# Patient Record
Sex: FEMALE | Race: WHITE | NOT HISPANIC OR LATINO | URBAN - METROPOLITAN AREA
[De-identification: names, ages, dates, MRNs, and addresses within clinical notes are randomized per-mention and may not be internally consistent; named-entity substitution may affect disease eponyms.]

---

## 2017-05-09 ENCOUNTER — OUTPATIENT (OUTPATIENT)
Dept: OUTPATIENT SERVICES | Facility: HOSPITAL | Age: 72
LOS: 1 days | End: 2017-05-09
Payer: MEDICARE

## 2017-05-09 PROCEDURE — 71020: CPT | Mod: 26

## 2017-05-09 PROCEDURE — 93306 TTE W/DOPPLER COMPLETE: CPT

## 2017-05-09 PROCEDURE — 71046 X-RAY EXAM CHEST 2 VIEWS: CPT

## 2017-05-09 PROCEDURE — 93306 TTE W/DOPPLER COMPLETE: CPT | Mod: 26

## 2017-05-15 ENCOUNTER — OUTPATIENT (OUTPATIENT)
Dept: OUTPATIENT SERVICES | Facility: HOSPITAL | Age: 72
LOS: 1 days | End: 2017-05-15
Payer: MEDICARE

## 2017-05-15 PROCEDURE — 71250 CT THORAX DX C-: CPT

## 2017-05-15 PROCEDURE — 71250 CT THORAX DX C-: CPT | Mod: 26

## 2017-05-18 ENCOUNTER — CHART COPY (OUTPATIENT)
Age: 72
End: 2017-05-18

## 2017-05-30 ENCOUNTER — APPOINTMENT (OUTPATIENT)
Dept: PULMONOLOGY | Facility: CLINIC | Age: 72
End: 2017-05-30

## 2017-05-30 VITALS
OXYGEN SATURATION: 96 % | TEMPERATURE: 98.4 F | HEART RATE: 76 BPM | DIASTOLIC BLOOD PRESSURE: 70 MMHG | BODY MASS INDEX: 14.85 KG/M2 | HEIGHT: 64 IN | SYSTOLIC BLOOD PRESSURE: 118 MMHG | WEIGHT: 87 LBS

## 2017-05-30 DIAGNOSIS — I25.10 ATHEROSCLEROTIC HEART DISEASE OF NATIVE CORONARY ARTERY W/OUT ANGINA PECTORIS: ICD-10-CM

## 2017-05-30 DIAGNOSIS — R91.8 OTHER NONSPECIFIC ABNORMAL FINDING OF LUNG FIELD: ICD-10-CM

## 2017-05-31 PROBLEM — I25.10 CORONARY DISEASE: Status: RESOLVED | Noted: 2017-05-31 | Resolved: 2017-05-31

## 2017-05-31 PROBLEM — R91.8 LUNG MASS: Status: ACTIVE | Noted: 2017-05-31

## 2017-06-01 ENCOUNTER — FORM ENCOUNTER (OUTPATIENT)
Age: 72
End: 2017-06-01

## 2017-06-02 ENCOUNTER — OUTPATIENT (OUTPATIENT)
Dept: OUTPATIENT SERVICES | Facility: HOSPITAL | Age: 72
LOS: 1 days | End: 2017-06-02
Payer: MEDICARE

## 2017-06-02 PROCEDURE — A9552: CPT

## 2017-06-02 PROCEDURE — 78815 PET IMAGE W/CT SKULL-THIGH: CPT

## 2017-06-02 PROCEDURE — 78815 PET IMAGE W/CT SKULL-THIGH: CPT | Mod: 26

## 2017-06-21 ENCOUNTER — FORM ENCOUNTER (OUTPATIENT)
Age: 72
End: 2017-06-21

## 2017-06-22 ENCOUNTER — OUTPATIENT (OUTPATIENT)
Dept: OUTPATIENT SERVICES | Facility: HOSPITAL | Age: 72
LOS: 1 days | End: 2017-06-22
Payer: MEDICARE

## 2017-06-22 ENCOUNTER — RESULT REVIEW (OUTPATIENT)
Age: 72
End: 2017-06-22

## 2017-06-22 ENCOUNTER — MESSAGE (OUTPATIENT)
Age: 72
End: 2017-06-22

## 2017-06-22 LAB
APTT BLD: 29 SEC — SIGNIFICANT CHANGE UP (ref 27.5–37.4)
INR BLD: 0.92 — SIGNIFICANT CHANGE UP (ref 0.88–1.16)
PROTHROM AB SERPL-ACNC: 10.2 SEC — SIGNIFICANT CHANGE UP (ref 9.8–12.7)

## 2017-06-22 PROCEDURE — 85730 THROMBOPLASTIN TIME PARTIAL: CPT

## 2017-06-22 PROCEDURE — 71035: CPT

## 2017-06-22 PROCEDURE — 77012 CT SCAN FOR NEEDLE BIOPSY: CPT | Mod: 26

## 2017-06-22 PROCEDURE — 32405: CPT

## 2017-06-22 PROCEDURE — 88173 CYTOPATH EVAL FNA REPORT: CPT

## 2017-06-22 PROCEDURE — 77012 CT SCAN FOR NEEDLE BIOPSY: CPT

## 2017-06-22 PROCEDURE — 87015 SPECIMEN INFECT AGNT CONCNTJ: CPT

## 2017-06-22 PROCEDURE — 87070 CULTURE OTHR SPECIMN AEROBIC: CPT

## 2017-06-22 PROCEDURE — 87206 SMEAR FLUORESCENT/ACID STAI: CPT

## 2017-06-22 PROCEDURE — 88312 SPECIAL STAINS GROUP 1: CPT

## 2017-06-22 PROCEDURE — 85610 PROTHROMBIN TIME: CPT

## 2017-06-22 PROCEDURE — 87075 CULTR BACTERIA EXCEPT BLOOD: CPT

## 2017-06-22 PROCEDURE — 87116 MYCOBACTERIA CULTURE: CPT

## 2017-06-22 PROCEDURE — 88305 TISSUE EXAM BY PATHOLOGIST: CPT

## 2017-06-22 PROCEDURE — 71035: CPT | Mod: 26

## 2017-06-22 RX ORDER — DIPHENHYDRAMINE HCL 50 MG
50 CAPSULE ORAL ONCE
Qty: 0 | Refills: 0 | Status: DISCONTINUED | OUTPATIENT
Start: 2017-06-22 | End: 2017-07-07

## 2017-06-22 RX ORDER — MIDAZOLAM HYDROCHLORIDE 1 MG/ML
1 INJECTION, SOLUTION INTRAMUSCULAR; INTRAVENOUS
Qty: 0 | Refills: 0 | Status: DISCONTINUED | OUTPATIENT
Start: 2017-06-22 | End: 2017-06-22

## 2017-06-23 LAB
GRAM STN FLD: SIGNIFICANT CHANGE UP
NIGHT BLUE STAIN TISS: SIGNIFICANT CHANGE UP
SPECIMEN SOURCE: SIGNIFICANT CHANGE UP
SPECIMEN SOURCE: SIGNIFICANT CHANGE UP

## 2017-06-25 LAB
CULTURE RESULTS: NO GROWTH — SIGNIFICANT CHANGE UP
NON-GYNECOLOGICAL CYTOLOGY STUDY: SIGNIFICANT CHANGE UP
SPECIMEN SOURCE: SIGNIFICANT CHANGE UP

## 2017-06-26 LAB — SURGICAL PATHOLOGY STUDY: SIGNIFICANT CHANGE UP

## 2017-07-02 LAB
APTT BLD: 41.7 SEC
BASOPHILS # BLD AUTO: 0.04 K/UL
BASOPHILS NFR BLD AUTO: 0.5 %
EOSINOPHIL # BLD AUTO: 0.26 K/UL
EOSINOPHIL NFR BLD AUTO: 3.6 %
HCT VFR BLD CALC: 34.3 %
HGB BLD-MCNC: 10 G/DL
IMM GRANULOCYTES NFR BLD AUTO: 0.3 %
INR PPP: 2.15 RATIO
LYMPHOCYTES # BLD AUTO: 1.86 K/UL
LYMPHOCYTES NFR BLD AUTO: 25.4 %
MAN DIFF?: NORMAL
MCHC RBC-ENTMCNC: 22.9 PG
MCHC RBC-ENTMCNC: 29.2 GM/DL
MCV RBC AUTO: 78.7 FL
MONOCYTES # BLD AUTO: 0.4 K/UL
MONOCYTES NFR BLD AUTO: 5.5 %
NEUTROPHILS # BLD AUTO: 4.73 K/UL
NEUTROPHILS NFR BLD AUTO: 64.7 %
PLATELET # BLD AUTO: 252 K/UL
PT BLD: 24.7 SEC
RBC # BLD: 4.36 M/UL
RBC # FLD: 17.6 %
WBC # FLD AUTO: 7.31 K/UL

## 2017-07-06 LAB — MISCELLANEOUS TEST NAME: SIGNIFICANT CHANGE UP

## 2017-08-15 ENCOUNTER — APPOINTMENT (OUTPATIENT)
Dept: HEART AND VASCULAR | Facility: CLINIC | Age: 72
End: 2017-08-15
Payer: MEDICARE

## 2017-08-15 VITALS — DIASTOLIC BLOOD PRESSURE: 60 MMHG | SYSTOLIC BLOOD PRESSURE: 115 MMHG | HEART RATE: 80 BPM

## 2017-08-15 PROCEDURE — 93283 PRGRMG EVAL IMPLANTABLE DFB: CPT

## 2017-08-17 ENCOUNTER — MOBILE ON CALL (OUTPATIENT)
Age: 72
End: 2017-08-17

## 2017-08-31 ENCOUNTER — MOBILE ON CALL (OUTPATIENT)
Age: 72
End: 2017-08-31

## 2017-09-05 ENCOUNTER — OTHER (OUTPATIENT)
Age: 72
End: 2017-09-05

## 2017-09-05 DIAGNOSIS — A31.0 PULMONARY MYCOBACTERIAL INFECTION: ICD-10-CM

## 2017-09-07 PROBLEM — A31.0 MAI (MYCOBACTERIUM AVIUM-INTRACELLULARE): Status: ACTIVE | Noted: 2017-09-07

## 2017-10-12 LAB
CULTURE RESULTS: SIGNIFICANT CHANGE UP
SPECIMEN SOURCE: SIGNIFICANT CHANGE UP

## 2017-11-14 ENCOUNTER — INPATIENT (INPATIENT)
Facility: HOSPITAL | Age: 72
LOS: 7 days | Discharge: HOME | DRG: 607 | End: 2017-11-22
Attending: SPECIALIST | Admitting: SPECIALIST
Payer: MEDICARE

## 2017-11-14 VITALS
RESPIRATION RATE: 18 BRPM | SYSTOLIC BLOOD PRESSURE: 125 MMHG | OXYGEN SATURATION: 98 % | HEART RATE: 66 BPM | DIASTOLIC BLOOD PRESSURE: 73 MMHG | TEMPERATURE: 98 F | WEIGHT: 87.08 LBS

## 2017-11-14 LAB
ANION GAP SERPL CALC-SCNC: 16 MMOL/L — SIGNIFICANT CHANGE UP (ref 5–17)
APPEARANCE UR: CLEAR — SIGNIFICANT CHANGE UP
APTT BLD: 38 SEC — HIGH (ref 27.5–37.4)
BASOPHILS NFR BLD AUTO: 0.9 % — SIGNIFICANT CHANGE UP (ref 0–2)
BILIRUB UR-MCNC: NEGATIVE — SIGNIFICANT CHANGE UP
BUN SERPL-MCNC: 18 MG/DL — SIGNIFICANT CHANGE UP (ref 7–23)
CALCIUM SERPL-MCNC: 9.3 MG/DL — SIGNIFICANT CHANGE UP (ref 8.4–10.5)
CHLORIDE SERPL-SCNC: 100 MMOL/L — SIGNIFICANT CHANGE UP (ref 96–108)
CO2 SERPL-SCNC: 21 MMOL/L — LOW (ref 22–31)
COLOR SPEC: YELLOW — SIGNIFICANT CHANGE UP
CREAT SERPL-MCNC: 0.93 MG/DL — SIGNIFICANT CHANGE UP (ref 0.5–1.3)
DIFF PNL FLD: NEGATIVE — SIGNIFICANT CHANGE UP
EOSINOPHIL NFR BLD AUTO: 2.2 % — SIGNIFICANT CHANGE UP (ref 0–6)
GLUCOSE SERPL-MCNC: 77 MG/DL — SIGNIFICANT CHANGE UP (ref 70–99)
GLUCOSE UR QL: NEGATIVE — SIGNIFICANT CHANGE UP
HCT VFR BLD CALC: 31.2 % — LOW (ref 34.5–45)
HGB BLD-MCNC: 9.1 G/DL — LOW (ref 11.5–15.5)
INR BLD: 2.06 — HIGH (ref 0.88–1.16)
KETONES UR-MCNC: NEGATIVE — SIGNIFICANT CHANGE UP
LACTATE SERPL-SCNC: 1.3 MMOL/L — SIGNIFICANT CHANGE UP (ref 0.5–2)
LEUKOCYTE ESTERASE UR-ACNC: NEGATIVE — SIGNIFICANT CHANGE UP
LYMPHOCYTES # BLD AUTO: 23.5 % — SIGNIFICANT CHANGE UP (ref 13–44)
MCHC RBC-ENTMCNC: 20.2 PG — LOW (ref 27–34)
MCHC RBC-ENTMCNC: 29.2 G/DL — LOW (ref 32–36)
MCV RBC AUTO: 69.2 FL — LOW (ref 80–100)
MONOCYTES NFR BLD AUTO: 5.5 % — SIGNIFICANT CHANGE UP (ref 2–14)
NEUTROPHILS NFR BLD AUTO: 67.9 % — SIGNIFICANT CHANGE UP (ref 43–77)
NITRITE UR-MCNC: NEGATIVE — SIGNIFICANT CHANGE UP
PH UR: 6 — SIGNIFICANT CHANGE UP (ref 5–8)
PLATELET # BLD AUTO: 218 K/UL — SIGNIFICANT CHANGE UP (ref 150–400)
POTASSIUM SERPL-MCNC: 4.4 MMOL/L — SIGNIFICANT CHANGE UP (ref 3.5–5.3)
POTASSIUM SERPL-SCNC: 4.4 MMOL/L — SIGNIFICANT CHANGE UP (ref 3.5–5.3)
PROT UR-MCNC: NEGATIVE MG/DL — SIGNIFICANT CHANGE UP
PROTHROM AB SERPL-ACNC: 23.2 SEC — HIGH (ref 9.8–12.7)
RBC # BLD: 4.51 M/UL — SIGNIFICANT CHANGE UP (ref 3.8–5.2)
RBC # FLD: 18.9 % — HIGH (ref 10.3–16.9)
SODIUM SERPL-SCNC: 137 MMOL/L — SIGNIFICANT CHANGE UP (ref 135–145)
SP GR SPEC: <=1.005 — SIGNIFICANT CHANGE UP (ref 1–1.03)
UROBILINOGEN FLD QL: 0.2 E.U./DL — SIGNIFICANT CHANGE UP
WBC # BLD: 7.7 K/UL — SIGNIFICANT CHANGE UP (ref 3.8–10.5)
WBC # FLD AUTO: 7.7 K/UL — SIGNIFICANT CHANGE UP (ref 3.8–10.5)

## 2017-11-14 PROCEDURE — 93010 ELECTROCARDIOGRAM REPORT: CPT

## 2017-11-14 PROCEDURE — 71010: CPT | Mod: 26

## 2017-11-14 PROCEDURE — 99285 EMERGENCY DEPT VISIT HI MDM: CPT | Mod: 25

## 2017-11-14 PROCEDURE — 99223 1ST HOSP IP/OBS HIGH 75: CPT | Mod: GC

## 2017-11-14 PROCEDURE — 93971 EXTREMITY STUDY: CPT | Mod: 26,LT

## 2017-11-14 RX ORDER — SODIUM CHLORIDE 9 MG/ML
500 INJECTION INTRAMUSCULAR; INTRAVENOUS; SUBCUTANEOUS ONCE
Qty: 0 | Refills: 0 | Status: COMPLETED | OUTPATIENT
Start: 2017-11-14 | End: 2017-11-14

## 2017-11-14 RX ORDER — ONDANSETRON 8 MG/1
4 TABLET, FILM COATED ORAL ONCE
Qty: 0 | Refills: 0 | Status: COMPLETED | OUTPATIENT
Start: 2017-11-14 | End: 2017-11-14

## 2017-11-14 RX ADMIN — ONDANSETRON 4 MILLIGRAM(S): 8 TABLET, FILM COATED ORAL at 21:45

## 2017-11-14 RX ADMIN — ONDANSETRON 4 MILLIGRAM(S): 8 TABLET, FILM COATED ORAL at 21:00

## 2017-11-14 NOTE — ED ADULT NURSE NOTE - CHPI ED SYMPTOMS NEG
no itching/no petechia/no bruising/no chills/no decreased eating/drinking/no scaly patches on skin/no fever/no vomiting

## 2017-11-14 NOTE — ED PROVIDER NOTE - MEDICAL DECISION MAKING DETAILS
72F with LUE swelling/redness, concern for cellulitis vs DVT. Distal neurovasc intact. Vitals wnl, pt is on Azithromycin having diarrhea/vomiting associated with that, unable to reji PO. Patient has been trying bacitracin topically, unable to help. Will attempt to contact Dr. Faust, Dr. Alvarenga. Spoke to sister to discuss need for admission. Plan for labs, imaging.

## 2017-11-14 NOTE — ED ADULT NURSE NOTE - PMH
Anxiety disorder due to medical condition  Anxiety disorder due to general medical condition  Atherosclerosis of coronary artery  CAD (coronary atherosclerotic disease)  Automatic implantable cardiac defibrillator in situ  AICD (automatic cardioverter/defibrillator) present  Congestive heart failure  EF 20-25%  Diaphragmatic hernia  Hiatal hernia  Essential hypertension  Hypertension  Gastroesophageal reflux disease  GERD (gastroesophageal reflux disease)  Insomnia  Insomnia  Migraine  Migraine  Old myocardial infarction  MI, old  Paroxysmal ventricular tachycardia  Ventricular tachycardia  Spinal stenosis  Spinal stenosis  Status post percutaneous transluminal coronary angioplasty  Stented coronary artery Anxiety disorder due to medical condition  Anxiety disorder due to general medical condition  Atherosclerosis of coronary artery  CAD (coronary atherosclerotic disease)  Automatic implantable cardiac defibrillator in situ  AICD (automatic cardioverter/defibrillator) present  Congestive heart failure  EF 20-25%  Diaphragmatic hernia  Hiatal hernia  Essential hypertension  Hypertension  Gastroesophageal reflux disease  GERD (gastroesophageal reflux disease)  Insomnia  Insomnia  Migraine  Migraine  Mycobacterium avium-intracellulare infection    Mycobacterium avium-intracellulare infection    Old myocardial infarction  MI, old  Paroxysmal ventricular tachycardia  Ventricular tachycardia  Spinal stenosis  Spinal stenosis  Status post percutaneous transluminal coronary angioplasty  Stented coronary artery

## 2017-11-14 NOTE — H&P ADULT - PROBLEM SELECTOR PLAN 8
- DASH/TLC diet Was prescribed Ethambutol and Azithromycin of unknownn duration by Dr. Faust, however hasn't been taken medications 2/2 GI side effect.  - f/u w/ Dr Faust and Dr. Alvarenga

## 2017-11-14 NOTE — ED PROVIDER NOTE - CONSTITUTIONAL, MLM
normal... Well appearing, well nourished, awake, alert speaking in full sentences, anxious appearing

## 2017-11-14 NOTE — ED PROVIDER NOTE - PROGRESS NOTE DETAILS
Attempted to call patient's sister, Attempted to call patient's sister, aware that patient is here. Spoke to Dr. Faust, pt has VLADIMIR, Azithromycin Ethambutol plan to admit under hospitalist. Spoke to Dr. Alvarenga, aware of admission.

## 2017-11-14 NOTE — ED PROVIDER NOTE - OBJECTIVE STATEMENT
72F with anxiety, CAD, AICD, CHF, HTN, presenting with LUE redness, swelling states sudden onset. Reports pain, itching.

## 2017-11-14 NOTE — H&P ADULT - PMH
Anxiety disorder due to medical condition  Anxiety disorder due to general medical condition  Atherosclerosis of coronary artery  CAD (coronary atherosclerotic disease)  Automatic implantable cardiac defibrillator in situ  AICD (automatic cardioverter/defibrillator) present  Congestive heart failure  EF 20-25%  Diaphragmatic hernia  Hiatal hernia  Essential hypertension  Hypertension  Gastroesophageal reflux disease  GERD (gastroesophageal reflux disease)  Insomnia  Insomnia  Migraine  Migraine  Mycobacterium avium-intracellulare infection    Mycobacterium avium-intracellulare infection    Old myocardial infarction  MI, old  Paroxysmal ventricular tachycardia  Ventricular tachycardia  Spinal stenosis  Spinal stenosis  Status post percutaneous transluminal coronary angioplasty  Stented coronary artery

## 2017-11-14 NOTE — H&P ADULT - FAMILY HISTORY
Family history of malignant neoplasm of trachea, bronchus, and lung, Family history of lung cancer     Family history of cardiovascular disease, Family history of congestive heart failure     Mother  Still living? Unknown  Family history of ischemic heart disease, Age at diagnosis: Age Unknown     Aunt  Still living? Unknown  Family history of ischemic heart disease, Age at diagnosis: Age Unknown

## 2017-11-14 NOTE — ED PROVIDER NOTE - PMH
Anxiety disorder due to medical condition  Anxiety disorder due to general medical condition  Atherosclerosis of coronary artery  CAD (coronary atherosclerotic disease)  Automatic implantable cardiac defibrillator in situ  AICD (automatic cardioverter/defibrillator) present  Congestive heart failure  EF 20-25%  Diaphragmatic hernia  Hiatal hernia  Essential hypertension  Hypertension  Gastroesophageal reflux disease  GERD (gastroesophageal reflux disease)  Insomnia  Insomnia  Migraine  Migraine  Old myocardial infarction  MI, old  Paroxysmal ventricular tachycardia  Ventricular tachycardia  Spinal stenosis  Spinal stenosis  Status post percutaneous transluminal coronary angioplasty  Stented coronary artery

## 2017-11-14 NOTE — H&P ADULT - PSH
Automatic implantable cardiac defibrillator in situ  Automatic implantable cardioverter-defibrillator in situ  Other postprocedural status  S/P appendectomy  Status post percutaneous transluminal coronary angioplasty  Stented coronary artery

## 2017-11-14 NOTE — H&P ADULT - NSHPPHYSICALEXAM_GEN_ALL_CORE
.  VITAL SIGNS:  T(C): 36.4 (11-14-17 @ 20:45), Max: 36.7 (11-14-17 @ 16:14)  T(F): 97.6 (11-14-17 @ 20:45), Max: 98 (11-14-17 @ 16:14)  HR: 66 (11-14-17 @ 20:45) (66 - 66)  BP: 156/62 (11-14-17 @ 20:45) (125/73 - 156/62)  BP(mean): --  RR: 16 (11-14-17 @ 20:45) (16 - 18)  SpO2: 99% (11-14-17 @ 20:45) (98% - 99%)  Wt(kg): --    PHYSICAL EXAM:    Constitutional: WDWN resting comfortably in bed; NAD  Head: NC/AT  Eyes: PERRL, EOMI, clear conjunctiva  ENT: no nasal discharge; uvula midline, no oropharyngeal erythema or exudates; MMM  Neck: supple; no JVD or thyromegaly  Respiratory: CTA B/L; no W/R/R, no retractions  Cardiac: +S1/S2; RRR; no M/R/G; PMI non-displaced  Gastrointestinal: soft, NT/ND; no rebound or guarding; +BSx4  Genitourinary: normal external genitalia  Back: spine midline, no bony tenderness or step-offs; no CVAT B/L  Extremities: WWP, no clubbing or cyanosis; no peripheral edema  Musculoskeletal: NROM x4; no joint swelling, tenderness or erythema  Vascular: 2+ radial, femoral, DP/PT pulses B/L  Dermatologic: skin warm, dry and intact; no rashes, wounds, or scars  Lymphatic: no submandibular or cervical LAD  Neurologic: AAOx3; CNII-XII grossly intact; no focal deficits  Psychiatric: affect and characteristics of appearance, verbalizations, behaviors are appropriate .  VITAL SIGNS:  T(C): 36.4 (11-14-17 @ 20:45), Max: 36.7 (11-14-17 @ 16:14)  T(F): 97.6 (11-14-17 @ 20:45), Max: 98 (11-14-17 @ 16:14)  HR: 66 (11-14-17 @ 20:45) (66 - 66)  BP: 156/62 (11-14-17 @ 20:45) (125/73 - 156/62)  BP(mean): --  RR: 16 (11-14-17 @ 20:45) (16 - 18)  SpO2: 99% (11-14-17 @ 20:45) (98% - 99%)  Wt(kg): --    PHYSICAL EXAM:    Constitutional: Very anxious and talkative but NAD  Head: NC/AT  Eyes: PERRL, EOMI, clear conjunctiva  ENT: no nasal discharge; uvula midline, no oropharyngeal erythema or exudates; mild dryness of mucous membrane  Neck: supple; no JVD or thyromegaly  Respiratory: CTA B/L; no W/R/R, no retractions  Cardiac: +S1/S2  Gastrointestinal: soft, NT/ND; no rebound or guarding; +BSx4  Extremities: Mild skin redness of LUE but not warm to touch, no edema, b/l LE WWP

## 2017-11-14 NOTE — ED ADULT TRIAGE NOTE - OTHER COMPLAINTS
biba c/o of left arm redness, swelling and warmth x few days.  pt also reports intermittetn shortness of breath

## 2017-11-14 NOTE — H&P ADULT - PROBLEM SELECTOR PLAN 1
Erythema of LUE concerning for cellulitis, however physical exam is negative for warmth to touch of LUE, no pain in arm right now, and no edema noted. Unclear etiology of skin color but cannot r/o eczema.   s/p US of LUE negative for thrombus.  - Will c/w Clindamycin for now Erythema of LUE concerning for cellulitis, however physical exam is negative for warmth to touch of LUE, no pain in arm right now, and no edema noted. Unclear etiology of skin color but cannot r/o eczema.   s/p US of LUE negative for thrombus.  - hold off on abx for now errythema of LUE concerning for cellulitis, however physical exam is negative for warmth to touch of LUE, no pain in arm currently, and no edema noted. Unclear etiology of skin color but cannot r/o eczema.   s/p US of LUE negative for thrombus.  - hold off on abx for now errythema of LUE, however physical exam is negative for warmth to touch of LUE, no pain in arm currently, and no edema noted. Low suspicion for cellulitis at this time. Unclear etiology of skin color but cannot r/o eczema vs dermatitis.   s/p US of LUE negative for thrombus.  - hold off on abx for now

## 2017-11-14 NOTE — H&P ADULT - PROBLEM SELECTOR PLAN 2
No concerning for HF exacerbation at this time. Last Echo showing EF of 20-25%.  - Will c/w with home HF regimen

## 2017-11-14 NOTE — H&P ADULT - PROBLEM SELECTOR PLAN 6
Pt w/ severe anxiety. On Lorazepam 1mg PO at home.  - Will continue w/ Lorazepam PRN Microcytic anemia on CBC. Hgb 9.6 unknown baseline.  - F/u w/ iron studies in AM

## 2017-11-14 NOTE — ED PROVIDER NOTE - SKIN, MLM
redness to arm, mild induration, mild ttp, affecting forearm, no significant edema 2+ pulse, mild sloughing of skin

## 2017-11-14 NOTE — H&P ADULT - NSHPLABSRESULTS_GEN_ALL_CORE
.  LABS:                         9.1    7.7   )-----------( 218      ( 14 Nov 2017 19:21 )             31.2     11-14    137  |  100  |  18  ----------------------------<  77  4.4   |  21<L>  |  0.93    Ca    9.3      14 Nov 2017 19:21      PT/INR - ( 14 Nov 2017 19:19 )   PT: 23.2 sec;   INR: 2.06          PTT - ( 14 Nov 2017 19:19 )  PTT:38.0 sec  Urinalysis Basic - ( 14 Nov 2017 19:33 )    Color: Yellow / Appearance: Clear / SG: <=1.005 / pH: x  Gluc: x / Ketone: NEGATIVE  / Bili: Negative / Urobili: 0.2 E.U./dL   Blood: x / Protein: NEGATIVE mg/dL / Nitrite: NEGATIVE   Leuk Esterase: NEGATIVE / RBC: x / WBC x   Sq Epi: x / Non Sq Epi: x / Bacteria: x      CARDIAC MARKERS ( 14 Nov 2017 19:21 )  x     / <0.01 ng/mL / x     / x     / x          Serum Pro-Brain Natriuretic Peptide: 1515 pg/mL (11-14 @ 19:21)    Lactate, Blood: 1.3 mmoL/L (11-14 @ 19:19)      RADIOLOGY, EKG & ADDITIONAL TESTS: Reviewed.

## 2017-11-14 NOTE — ED ADULT NURSE REASSESSMENT NOTE - NS ED NURSE REASSESS COMMENT FT1
Pt continues to be uncooperative. Pt making multiple requests and demands from ED staff then refuses or delays staff or care when staff is at bedside. Pt at times is verbally abusive to staff.  Blood draw delayed by Pt agreeing then refusing then agreeing to blood draw.
Per covering RN, PT refuses fluids and antibiotics at this time.  Will continue to monitor. Pt advised of benefits of medication, pt continues to refuse. Pt is alert and oriented x3.
Report given to [facility],  US notified for transport to [facility].

## 2017-11-14 NOTE — H&P ADULT - PROBLEM SELECTOR PLAN 3
Pt w/ hx of PCI. No angina at this time. No suspicion for ACS.   - c/w aspirin, metoprolol and lipitor Pt w/ hx of PCI. No angina at this time. No suspicion for ACS.   - c/w aspirin, metoprolol and Lipitor

## 2017-11-14 NOTE — H&P ADULT - HISTORY OF PRESENT ILLNESS
72F with anxiety, CAD, AICD, CHF, HTN 72 F with PMHx anxiety, CAD s/p PCI, AICD/PPM, CHF, hiatal hernia, HTN, GERD, insomnia,  ventricular tachycardia, spinal stenosis s/p herniated disc repair, and arthritis was BIBA complaining of 1-day history of left upper extremity swelling, redness associated with pain. Denies any fever or chills. Patient also reports some nonspecific symptoms of dyspnea but no chest pain or palpitations. No recent mosquito bites in upper extremity. Patient is known to Dr. Faust and Dr. Alvarenga. Of note, patient has a ?hx of VLADIMIR and was prescribed Ethambutol and Azithromycin by , however has been unable to tolerate medications due to nausea and vomiting.  As per ED physician, the swelling in LUE was very subjective. Vitals in ED were unremarkable. Patient underwent a LUE US which was negative. Ordered for Clindamycin, which patient refused. Admitted to [facility] for further evaluation. 72 F with PMHx anxiety, CAD s/p PCI, AICD/PPM, CHF, hiatal hernia, HTN, GERD, insomnia,  ventricular tachycardia, spinal stenosis s/p herniated disc repair, and arthritis was BIBA complaining of 1-day history of left upper extremity swelling, redness associated with pain. Denies any fever or chills. Patient also reports some nonspecific symptoms of dyspnea but no chest pain or palpitations. No recent mosquito bites in upper extremity. Patient is known to Dr. Faust and Dr. Alvarenga. Of note, patient has a ?hx of VLADIMIR and was prescribed Ethambutol and Azithromycin by , however has been unable to tolerate medications due to nausea and vomiting.  As per ED physician, the swelling in LUE was very subjective. Vitals in ED were overall unremarkable. Patient underwent a LUE US which was negative. Ordered for Clindamycin, which patient refused. Admitted to [facility] for further evaluation.

## 2017-11-14 NOTE — H&P ADULT - PROBLEM SELECTOR PLAN 7
Was prescribed Ethambutol and Azithromycin of unknownn duration by Dr. Faust, however hasn't been taken medications 2/2 GI side effect.  - f/u w/ Dr Faust and Dr. Alvarenga Pt w/ severe anxiety. On Lorazepam 1mg PO at home.  - Will continue w/ Lorazepam PRN

## 2017-11-14 NOTE — ED ADULT NURSE NOTE - OBJECTIVE STATEMENT
Pt came to ED complaining of bilateral arm cellulitis and chronic SOB.  Pt currently uncooperative, PT refuses to let RN ask questions, Pt keeps talking and refusing to answer direct questions about history of current illness, refuses to change into gown, refuse to let RN evaluate lung sounds. Pt is uncooperative unless she is allowed she is speaking.  Several attempts at redirection has been unsuccessful. PT is alert and oriented x3. Pt came to ED complaining of bilateral arm cellulitis and chronic SOB.  Pt currently uncooperative, PT refuses to let RN ask questions, Pt keeps talking and refusing to answer direct questions about history of current illness, refuses to change into gown, refuse to let RN evaluate lung sounds. Pt is uncooperative unless she is speaking.  Several attempts at redirection has been unsuccessful. PT is alert and oriented x3.  Pt has redness, swelling, warmth on bilateral forearms, pt refuses to change to expose arms or skin.  Pt reports hx of DUY infection.  Pt currently speaking in complete, full sentences without pause. No SOB noted. Pt came to ED complaining of bilateral arm cellulitis and chronic SOB.  Pt currently uncooperative, PT refuses to let RN ask questions, Pt keeps talking and refusing to answer direct questions about history of current illness, refuses to change into gown, refuse to let RN evaluate lung sounds. Pt is uncooperative unless she is speaking.  Several attempts at redirection has been unsuccessful. PT is alert and oriented x3.  Pt has redness, swelling, warmth on bilateral forearms, pt refuses to change to expose arms or skin.  Pt reports hx of DUY infection.  Pt currently speaking in complete, full sentences without pause. No SOB noted.  PT refuses to give complete medication list.  Pt refuses lung sounds.

## 2017-11-14 NOTE — H&P ADULT - ASSESSMENT
72 F with PMHx anxiety, CAD s/p PCI, AICD/PPM, CHF, hiatal hernia, HTN, GERD, insomnia,  ventricular tachycardia, spinal stenosis s/p herniated disc repair, and arthritis here for LUE erytthema and subjective swelling concerning for cellulitis

## 2017-11-15 ENCOUNTER — TRANSCRIPTION ENCOUNTER (OUTPATIENT)
Age: 72
End: 2017-11-15

## 2017-11-15 DIAGNOSIS — Z29.9 ENCOUNTER FOR PROPHYLACTIC MEASURES, UNSPECIFIED: ICD-10-CM

## 2017-11-15 DIAGNOSIS — F41.9 ANXIETY DISORDER, UNSPECIFIED: ICD-10-CM

## 2017-11-15 DIAGNOSIS — I50.22 CHRONIC SYSTOLIC (CONGESTIVE) HEART FAILURE: ICD-10-CM

## 2017-11-15 DIAGNOSIS — R63.8 OTHER SYMPTOMS AND SIGNS CONCERNING FOOD AND FLUID INTAKE: ICD-10-CM

## 2017-11-15 DIAGNOSIS — I82.90 ACUTE EMBOLISM AND THROMBOSIS OF UNSPECIFIED VEIN: ICD-10-CM

## 2017-11-15 DIAGNOSIS — F06.4 ANXIETY DISORDER DUE TO KNOWN PHYSIOLOGICAL CONDITION: ICD-10-CM

## 2017-11-15 DIAGNOSIS — L03.114 CELLULITIS OF LEFT UPPER LIMB: ICD-10-CM

## 2017-11-15 DIAGNOSIS — R60.0 LOCALIZED EDEMA: ICD-10-CM

## 2017-11-15 DIAGNOSIS — I47.2 VENTRICULAR TACHYCARDIA: ICD-10-CM

## 2017-11-15 DIAGNOSIS — G43.909 MIGRAINE, UNSPECIFIED, NOT INTRACTABLE, WITHOUT STATUS MIGRAINOSUS: ICD-10-CM

## 2017-11-15 DIAGNOSIS — D50.9 IRON DEFICIENCY ANEMIA, UNSPECIFIED: ICD-10-CM

## 2017-11-15 DIAGNOSIS — I25.10 ATHEROSCLEROTIC HEART DISEASE OF NATIVE CORONARY ARTERY WITHOUT ANGINA PECTORIS: ICD-10-CM

## 2017-11-15 DIAGNOSIS — D64.9 ANEMIA, UNSPECIFIED: ICD-10-CM

## 2017-11-15 DIAGNOSIS — L30.9 DERMATITIS, UNSPECIFIED: ICD-10-CM

## 2017-11-15 DIAGNOSIS — I50.9 HEART FAILURE, UNSPECIFIED: ICD-10-CM

## 2017-11-15 DIAGNOSIS — A31.0 PULMONARY MYCOBACTERIAL INFECTION: ICD-10-CM

## 2017-11-15 DIAGNOSIS — I10 ESSENTIAL (PRIMARY) HYPERTENSION: ICD-10-CM

## 2017-11-15 LAB
ANION GAP SERPL CALC-SCNC: 15 MMOL/L — SIGNIFICANT CHANGE UP (ref 5–17)
BUN SERPL-MCNC: 17 MG/DL — SIGNIFICANT CHANGE UP (ref 7–23)
CALCIUM SERPL-MCNC: 8.9 MG/DL — SIGNIFICANT CHANGE UP (ref 8.4–10.5)
CHLORIDE SERPL-SCNC: 97 MMOL/L — SIGNIFICANT CHANGE UP (ref 96–108)
CO2 SERPL-SCNC: 20 MMOL/L — LOW (ref 22–31)
CREAT SERPL-MCNC: 0.91 MG/DL — SIGNIFICANT CHANGE UP (ref 0.5–1.3)
CULTURE RESULTS: NO GROWTH — SIGNIFICANT CHANGE UP
FERRITIN SERPL-MCNC: 7.4 NG/ML — LOW (ref 15–150)
GLUCOSE SERPL-MCNC: 72 MG/DL — SIGNIFICANT CHANGE UP (ref 70–99)
HCT VFR BLD CALC: 31.4 % — LOW (ref 34.5–45)
HGB BLD-MCNC: 9 G/DL — LOW (ref 11.5–15.5)
INR BLD: 1.89 — HIGH (ref 0.88–1.16)
IRON SATN MFR SERPL: 15 UG/DL — LOW (ref 30–160)
IRON SATN MFR SERPL: 5 % — LOW (ref 14–50)
MAGNESIUM SERPL-MCNC: 2.2 MG/DL — SIGNIFICANT CHANGE UP (ref 1.6–2.6)
MCHC RBC-ENTMCNC: 20 PG — LOW (ref 27–34)
MCHC RBC-ENTMCNC: 28.7 G/DL — LOW (ref 32–36)
MCV RBC AUTO: 69.6 FL — LOW (ref 80–100)
PLATELET # BLD AUTO: 219 K/UL — SIGNIFICANT CHANGE UP (ref 150–400)
POTASSIUM SERPL-MCNC: 4.1 MMOL/L — SIGNIFICANT CHANGE UP (ref 3.5–5.3)
POTASSIUM SERPL-SCNC: 4.1 MMOL/L — SIGNIFICANT CHANGE UP (ref 3.5–5.3)
PROTHROM AB SERPL-ACNC: 21.3 SEC — HIGH (ref 9.8–12.7)
RBC # BLD: 4.51 M/UL — SIGNIFICANT CHANGE UP (ref 3.8–5.2)
RBC # FLD: 19.4 % — HIGH (ref 10.3–16.9)
SODIUM SERPL-SCNC: 132 MMOL/L — LOW (ref 135–145)
SPECIMEN SOURCE: SIGNIFICANT CHANGE UP
TIBC SERPL-MCNC: 327 UG/DL — SIGNIFICANT CHANGE UP (ref 220–430)
TRANSFERRIN SERPL-MCNC: 278 MG/DL — SIGNIFICANT CHANGE UP (ref 200–360)
UIBC SERPL-MCNC: 312 UG/DL — SIGNIFICANT CHANGE UP (ref 110–370)
WBC # BLD: 7.6 K/UL — SIGNIFICANT CHANGE UP (ref 3.8–10.5)
WBC # FLD AUTO: 7.6 K/UL — SIGNIFICANT CHANGE UP (ref 3.8–10.5)

## 2017-11-15 PROCEDURE — 99233 SBSQ HOSP IP/OBS HIGH 50: CPT | Mod: GC

## 2017-11-15 RX ORDER — FERROUS SULFATE 325(65) MG
325 TABLET ORAL DAILY
Qty: 0 | Refills: 0 | Status: DISCONTINUED | OUTPATIENT
Start: 2017-11-15 | End: 2017-11-22

## 2017-11-15 RX ORDER — PANTOPRAZOLE SODIUM 20 MG/1
40 TABLET, DELAYED RELEASE ORAL
Qty: 0 | Refills: 0 | Status: DISCONTINUED | OUTPATIENT
Start: 2017-11-15 | End: 2017-11-15

## 2017-11-15 RX ORDER — SPIRONOLACTONE 25 MG/1
25 TABLET, FILM COATED ORAL DAILY
Qty: 0 | Refills: 0 | Status: DISCONTINUED | OUTPATIENT
Start: 2017-11-15 | End: 2017-11-22

## 2017-11-15 RX ORDER — ONDANSETRON 8 MG/1
4 TABLET, FILM COATED ORAL ONCE
Qty: 0 | Refills: 0 | Status: COMPLETED | OUTPATIENT
Start: 2017-11-15 | End: 2017-11-15

## 2017-11-15 RX ORDER — METOPROLOL TARTRATE 50 MG
25 TABLET ORAL DAILY
Qty: 0 | Refills: 0 | Status: DISCONTINUED | OUTPATIENT
Start: 2017-11-15 | End: 2017-11-22

## 2017-11-15 RX ORDER — BACITRACIN AND POLYMYXIN B SULFATE 500; 10000 [USP'U]/G; [USP'U]/G
1 OINTMENT TOPICAL
Qty: 0 | Refills: 0 | Status: DISCONTINUED | OUTPATIENT
Start: 2017-11-15 | End: 2017-11-22

## 2017-11-15 RX ORDER — ATORVASTATIN CALCIUM 80 MG/1
20 TABLET, FILM COATED ORAL AT BEDTIME
Qty: 0 | Refills: 0 | Status: DISCONTINUED | OUTPATIENT
Start: 2017-11-15 | End: 2017-11-18

## 2017-11-15 RX ORDER — WARFARIN SODIUM 2.5 MG/1
6 TABLET ORAL ONCE
Qty: 0 | Refills: 0 | Status: COMPLETED | OUTPATIENT
Start: 2017-11-15 | End: 2017-11-15

## 2017-11-15 RX ORDER — AMIODARONE HYDROCHLORIDE 400 MG/1
100 TABLET ORAL DAILY
Qty: 0 | Refills: 0 | Status: DISCONTINUED | OUTPATIENT
Start: 2017-11-15 | End: 2017-11-22

## 2017-11-15 RX ORDER — BACITRACIN AND POLYMYXIN B SULFATE 500; 10000 [USP'U]/G; [USP'U]/G
1 OINTMENT TOPICAL
Qty: 1 | Refills: 0 | OUTPATIENT
Start: 2017-11-15

## 2017-11-15 RX ORDER — AMIODARONE HYDROCHLORIDE 400 MG/1
50 TABLET ORAL DAILY
Qty: 0 | Refills: 0 | Status: DISCONTINUED | OUTPATIENT
Start: 2017-11-15 | End: 2017-11-15

## 2017-11-15 RX ORDER — PANTOPRAZOLE SODIUM 20 MG/1
40 TABLET, DELAYED RELEASE ORAL
Qty: 0 | Refills: 0 | Status: DISCONTINUED | OUTPATIENT
Start: 2017-11-15 | End: 2017-11-22

## 2017-11-15 RX ORDER — ONDANSETRON 8 MG/1
8 TABLET, FILM COATED ORAL ONCE
Qty: 0 | Refills: 0 | Status: COMPLETED | OUTPATIENT
Start: 2017-11-15 | End: 2017-11-15

## 2017-11-15 RX ORDER — FERROUS SULFATE 325(65) MG
1 TABLET ORAL
Qty: 14 | Refills: 0 | OUTPATIENT
Start: 2017-11-15 | End: 2017-11-29

## 2017-11-15 RX ORDER — ASPIRIN/CALCIUM CARB/MAGNESIUM 324 MG
81 TABLET ORAL DAILY
Qty: 0 | Refills: 0 | Status: DISCONTINUED | OUTPATIENT
Start: 2017-11-15 | End: 2017-11-22

## 2017-11-15 RX ADMIN — Medication 1 MILLIGRAM(S): at 18:44

## 2017-11-15 RX ADMIN — Medication 2 TABLET(S): at 08:45

## 2017-11-15 RX ADMIN — Medication 25 MILLIGRAM(S): at 08:49

## 2017-11-15 RX ADMIN — Medication 2.5 MILLIGRAM(S): at 08:49

## 2017-11-15 RX ADMIN — ONDANSETRON 4 MILLIGRAM(S): 8 TABLET, FILM COATED ORAL at 10:11

## 2017-11-15 RX ADMIN — WARFARIN SODIUM 6 MILLIGRAM(S): 2.5 TABLET ORAL at 17:21

## 2017-11-15 RX ADMIN — Medication 81 MILLIGRAM(S): at 08:49

## 2017-11-15 RX ADMIN — ONDANSETRON 8 MILLIGRAM(S): 8 TABLET, FILM COATED ORAL at 12:57

## 2017-11-15 RX ADMIN — ONDANSETRON 4 MILLIGRAM(S): 8 TABLET, FILM COATED ORAL at 02:58

## 2017-11-15 RX ADMIN — Medication 2 TABLET(S): at 13:30

## 2017-11-15 RX ADMIN — AMIODARONE HYDROCHLORIDE 100 MILLIGRAM(S): 400 TABLET ORAL at 12:57

## 2017-11-15 RX ADMIN — PANTOPRAZOLE SODIUM 40 MILLIGRAM(S): 20 TABLET, DELAYED RELEASE ORAL at 07:55

## 2017-11-15 RX ADMIN — Medication 1 MILLIGRAM(S): at 02:28

## 2017-11-15 RX ADMIN — PANTOPRAZOLE SODIUM 40 MILLIGRAM(S): 20 TABLET, DELAYED RELEASE ORAL at 19:48

## 2017-11-15 RX ADMIN — Medication 2 TABLET(S): at 03:15

## 2017-11-15 RX ADMIN — BACITRACIN AND POLYMYXIN B SULFATE 1 APPLICATION(S): 500; 10000 OINTMENT TOPICAL at 22:41

## 2017-11-15 RX ADMIN — Medication 2 TABLET(S): at 19:51

## 2017-11-15 RX ADMIN — Medication 2 TABLET(S): at 02:30

## 2017-11-15 RX ADMIN — Medication 1 TABLET(S): at 19:48

## 2017-11-15 RX ADMIN — Medication 2 TABLET(S): at 14:30

## 2017-11-15 RX ADMIN — BACITRACIN AND POLYMYXIN B SULFATE 1 APPLICATION(S): 500; 10000 OINTMENT TOPICAL at 12:01

## 2017-11-15 RX ADMIN — Medication 2 TABLET(S): at 07:59

## 2017-11-15 NOTE — DIETITIAN INITIAL EVALUATION ADULT. - ENERGY NEEDS
Height 64"; ABW 87#; #; 73%IBW  BMI 14.9  Ideal body weight used for calculations as pt <80% of IBW. Needs estimated 2/2 weight loss, recent decreased PO intake.

## 2017-11-15 NOTE — DIETITIAN INITIAL EVALUATION ADULT. - PROBLEM SELECTOR PLAN 3
Pt w/ hx of PCI. No angina at this time. No suspicion for ACS.   - c/w aspirin, metoprolol and Lipitor

## 2017-11-15 NOTE — DIETITIAN INITIAL EVALUATION ADULT. - OTHER INFO
73 yo/female admitted with possible upper arm cellulitis, also with N/V x 2 weeks 2/2 abx intolerance. pt visited in room, awake, alert, very plesant woman. Pt reports inability to keep anything down for 2 weeks 2/2 N/V; slowly regaining appetite though. Usually does not eat much; follows strict cardiac diet 2/2 PMH and family history. Lots of fish, oatmeal, vegetables; no caffeine or marinara sauce; no true food allergies, just intolerances. Some nausea still reported but no emesis- receiving zofran. Pt reports always being thin, but unsure of recent weigh lost. Has been in and out of hospital over past year and weight has fluctuated. Pt presenting with moderate malnutrition.

## 2017-11-15 NOTE — PROGRESS NOTE ADULT - PROBLEM SELECTOR PLAN 1
-errythema of LUE most likely 2/2 dermatits.  -  physical exam is negative for warmth to touch of LUE, no pain in arm currently, and no edema noted. Low suspicion for cellulitis at this time.    s/p US of LUE negative for thrombus.  - hold off on abx for now.  -pt given 24 hr notice.

## 2017-11-15 NOTE — DISCHARGE NOTE ADULT - PROVIDER TOKENS
FREE:[LAST:[MD Lyle],PHONE:[(   )    -],FAX:[(   )    -]] FREE:[LAST:[MD Lyle],PHONE:[(   )    -],FAX:[(   )    -]],TOKEN:'[id number]'

## 2017-11-15 NOTE — PROGRESS NOTE ADULT - SUBJECTIVE AND OBJECTIVE BOX
INTERVAL HPI/OVERNIGHT EVENTS:pt was seen and examoined at the bedside. NO acute events o/n    VITAL SIGNS:  T(F): 97.9 (11-15-17 @ 09:31)  HR: 69 (11-15-17 @ 09:31)  BP: 128/75 (11-15-17 @ 09:31)  RR: 20 (11-15-17 @ 13:52)  SpO2: 95% (11-15-17 @ 13:52)  Wt(kg): --    PHYSICAL EXAM:    Constitutional: NAD, well-groomed, well-developed  HEENT: PERRLA, EOMI, Normal Hearing, MMM  Neck: No LAD, No JVD  Back: Normal spine flexure, No CVA tenderness  Respiratory: CTAB   Cardiovascular: S1 and S2, RRR, no M/G/R  Gastrointestinal: BS+, soft, NT/ND  Extremities: No peripheral edema  Vascular: 2+ peripheral pulses  Neurological: A/O x 3, no focal deficits  Psychiatric: Normal mood, normal affect  Musculoskeletal: 5/5 strength b/l upper and lower extremities  Skin: No rashes      MEDICATIONS  (STANDING):  amiodarone    Tablet 100 milliGRAM(s) Oral daily  aspirin enteric coated 81 milliGRAM(s) Oral daily  atorvastatin 20 milliGRAM(s) Oral at bedtime  BACItracin/polymyxin B Ointment 1 Application(s) Topical two times a day  enalapril 2.5 milliGRAM(s) Oral daily  ferrous    sulfate 325 milliGRAM(s) Oral daily  LORazepam     Tablet 1 milliGRAM(s) Oral once  metoprolol succinate ER 25 milliGRAM(s) Oral daily  pantoprazole    Tablet 40 milliGRAM(s) Oral before breakfast  spironolactone 25 milliGRAM(s) Oral daily  warfarin 6 milliGRAM(s) Oral once    MEDICATIONS  (PRN):  acetaminophen 325 mG/butalbital 50 mG/caffeine 40 mG 2 Tablet(s) Oral every 6 hours PRN headache      Allergies    codeine (Unknown)  Demerol HCl (Unknown)  nitroglycerin (Unknown)  Percocet 10/325 (Unknown)  sulfa drugs (Unknown)    Intolerances        LABS:                        9.0    7.6   )-----------( 219      ( 15 Nov 2017 06:11 )             31.4     11-15    132<L>  |  97  |  17  ----------------------------<  72  4.1   |  20<L>  |  0.91    Ca    8.9      15 Nov 2017 06:11  Mg     2.2     11-15      PT/INR - ( 15 Nov 2017 06:12 )   PT: 21.3 sec;   INR: 1.89          PTT - ( 14 Nov 2017 19:19 )  PTT:38.0 sec  Urinalysis Basic - ( 14 Nov 2017 19:33 )    Color: Yellow / Appearance: Clear / SG: <=1.005 / pH: x  Gluc: x / Ketone: NEGATIVE  / Bili: Negative / Urobili: 0.2 E.U./dL   Blood: x / Protein: NEGATIVE mg/dL / Nitrite: NEGATIVE   Leuk Esterase: NEGATIVE / RBC: x / WBC x   Sq Epi: x / Non Sq Epi: x / Bacteria: x        RADIOLOGY & ADDITIONAL TESTS: INTERVAL HPI/OVERNIGHT EVENTS:pt was seen and examoined at the bedside. Swelling and pain in left upper extremity now resolved. pt given 24 hr notice.    VITAL SIGNS:  T(F): 97.9 (11-15-17 @ 09:31)  HR: 69 (11-15-17 @ 09:31)  BP: 128/75 (11-15-17 @ 09:31)  RR: 20 (11-15-17 @ 13:52)  SpO2: 95% (11-15-17 @ 13:52)  Wt(kg): --    PHYSICAL EXAM:    Constitutional: Very anxious and talkative but NAD  Head: NC/AT  Eyes: PERRL, EOMI, clear conjunctiva  ENT: no nasal discharge; uvula midline, no oropharyngeal erythema or exudates; mild dryness of mucous membrane  Neck: supple; no JVD or thyromegaly  Respiratory: CTA B/L; no W/R/R, no retractions  Cardiac: +S1/S2  Gastrointestinal: soft, NT/ND; no rebound or guarding; +BSx4  Extremities: Mild skin redness of LUE but not warm to touch, no edema, b/l LE WWP      MEDICATIONS  (STANDING):  amiodarone    Tablet 100 milliGRAM(s) Oral daily  aspirin enteric coated 81 milliGRAM(s) Oral daily  atorvastatin 20 milliGRAM(s) Oral at bedtime  BACItracin/polymyxin B Ointment 1 Application(s) Topical two times a day  enalapril 2.5 milliGRAM(s) Oral daily  ferrous    sulfate 325 milliGRAM(s) Oral daily  LORazepam     Tablet 1 milliGRAM(s) Oral once  metoprolol succinate ER 25 milliGRAM(s) Oral daily  pantoprazole    Tablet 40 milliGRAM(s) Oral before breakfast  spironolactone 25 milliGRAM(s) Oral daily  warfarin 6 milliGRAM(s) Oral once    MEDICATIONS  (PRN):  acetaminophen 325 mG/butalbital 50 mG/caffeine 40 mG 2 Tablet(s) Oral every 6 hours PRN headache      Allergies    codeine (Unknown)  Demerol HCl (Unknown)  nitroglycerin (Unknown)  Percocet 10/325 (Unknown)  sulfa drugs (Unknown)    Intolerances        LABS:                        9.0    7.6   )-----------( 219      ( 15 Nov 2017 06:11 )             31.4     11-15    132<L>  |  97  |  17  ----------------------------<  72  4.1   |  20<L>  |  0.91    Ca    8.9      15 Nov 2017 06:11  Mg     2.2     11-15      PT/INR - ( 15 Nov 2017 06:12 )   PT: 21.3 sec;   INR: 1.89          PTT - ( 14 Nov 2017 19:19 )  PTT:38.0 sec  Urinalysis Basic - ( 14 Nov 2017 19:33 )    Color: Yellow / Appearance: Clear / SG: <=1.005 / pH: x  Gluc: x / Ketone: NEGATIVE  / Bili: Negative / Urobili: 0.2 E.U./dL   Blood: x / Protein: NEGATIVE mg/dL / Nitrite: NEGATIVE   Leuk Esterase: NEGATIVE / RBC: x / WBC x   Sq Epi: x / Non Sq Epi: x / Bacteria: x        RADIOLOGY & ADDITIONAL TESTS:

## 2017-11-15 NOTE — DISCHARGE NOTE ADULT - PATIENT PORTAL LINK FT
“You can access the [employer] Patient Portal, offered by [facility], by registering with the following website: [email or web address]”

## 2017-11-15 NOTE — PROGRESS NOTE ADULT - SUBJECTIVE AND OBJECTIVE BOX
Patient is a 72y old  Female who presents with a chief complaint of LUE swelling and redness (15 Nov 2017 11:58)      INTERVAL HPI/OVERNIGHT EVENTS:  states LUE redness and swelling decreased significantly overnight  denies fever, chills,       Review of Systems: 12 point review of systems otherwise negative    MEDICATIONS  (STANDING):  amiodarone    Tablet 100 milliGRAM(s) Oral daily  aspirin enteric coated 81 milliGRAM(s) Oral daily  atorvastatin 20 milliGRAM(s) Oral at bedtime  BACItracin/polymyxin B Ointment 1 Application(s) Topical two times a day  enalapril 2.5 milliGRAM(s) Oral daily  ferrous    sulfate 325 milliGRAM(s) Oral daily  LORazepam     Tablet 1 milliGRAM(s) Oral once  metoprolol succinate ER 25 milliGRAM(s) Oral daily  pantoprazole    Tablet 40 milliGRAM(s) Oral before breakfast  spironolactone 25 milliGRAM(s) Oral daily  warfarin 6 milliGRAM(s) Oral once    MEDICATIONS  (PRN):  acetaminophen 325 mG/butalbital 50 mG/caffeine 40 mG 2 Tablet(s) Oral every 6 hours PRN headache      Allergies    codeine (Unknown)  Demerol HCl (Unknown)  nitroglycerin (Unknown)  Percocet 10/325 (Unknown)  sulfa drugs (Unknown)    Intolerances          Vital Signs Last 24 Hrs  T(C): 36.6 (15 Nov 2017 09:31), Max: 37.1 (2017 23:35)  T(F): 97.9 (15 Nov 2017 09:31), Max: 98.8 (2017 23:35)  HR: 69 (15 Nov 2017 09:31) (66 - 78)  BP: 128/75 (15 Nov 2017 09:31) (128/75 - 168/99)  BP(mean): --  RR: 20 (15 Nov 2017 13:52) (16 - 21)  SpO2: 95% (15 Nov 2017 13:52) (94% - 99%)  CAPILLARY BLOOD GLUCOSE            Physical Exam:    Daily     Daily Weight in k.4 (15 Nov 2017 15:24)  General:  frail lady very anxious, saturating well on 96-97%RA  CV:  RRR,   Lungs:  CTA B/L  Abdomen:  Soft, non-tender, no distended, positive BS  Extremities:  +slight erythema of LUE, no warmth, +radial pulse  Skin:  +slight erythema of LUE  Neuro:  AAOx3, non-focal    LABS:                        9.0    7.6   )-----------( 219      ( 15 Nov 2017 06:11 )             31.4     11-15    132<L>  |  97  |  17  ----------------------------<  72  4.1   |  20<L>  |  0.91    Ca    8.9      15 Nov 2017 06:11  Mg     2.2     11-15      PT/INR - ( 15 Nov 2017 06:12 )   PT: 21.3 sec;   INR: 1.89          PTT - ( 2017 19:19 )  PTT:38.0 sec  Urinalysis Basic - ( 2017 19:33 )    Color: Yellow / Appearance: Clear / SG: <=1.005 / pH: x  Gluc: x / Ketone: NEGATIVE  / Bili: Negative / Urobili: 0.2 E.U./dL   Blood: x / Protein: NEGATIVE mg/dL / Nitrite: NEGATIVE   Leuk Esterase: NEGATIVE / RBC: x / WBC x   Sq Epi: x / Non Sq Epi: x / Bacteria: x

## 2017-11-15 NOTE — PROGRESS NOTE ADULT - ASSESSMENT
72 F with PMHx anxiety, CAD s/p PCI, AICD/PPM, CHF, hiatal hernia, HTN, GERD, insomnia,  ventricular tachycardia, spinal stenosis s/p herniated disc repair, and arthritis here for LUE erytthema and subjective swelling. Now resolved. Etiology most likely 2/2 dermatitis. pt given 24 hr notice.

## 2017-11-15 NOTE — DISCHARGE NOTE ADULT - CARE PLAN
Principal Discharge DX:	Dermatitis  Goal:	effective control of redness and itching  Instructions for follow-up, activity and diet:	you came in with complaints of redness, pain and swelling of left upper extremity, This was most likely  secondary to dermatitis. Swelling and redness resolved on its own during the course of your admission. You are being discharged on bacitracin ointment to prevent risk of any superimposed bacterial infection.  Secondary Diagnosis:	Congestive heart failure (CHF)  Instructions for follow-up, activity and diet:	You came in with history of CHF. Please continue with your home medications of heart failure.  Secondary Diagnosis:	CAD (coronary artery disease)  Instructions for follow-up, activity and diet:	You came in with hx of CAD. please continue wwith home medications of aspirin, lipitor and metoprolol.  Secondary Diagnosis:	Essential hypertension  Instructions for follow-up, activity and diet:	Please c.w home medications of enalapril and metoprolol.  Secondary Diagnosis:	Paroxysmal ventricular tachycardia  Instructions for follow-up, activity and diet:	Please c.w home dose of coumadin and amiodarone  Secondary Diagnosis:	Mycobacterium avium-intracellulare infection  Instructions for follow-up, activity and diet:	please c.w with your home medications ethambutol and azithromycin as prescribed Principal Discharge DX:	Dermatitis  Goal:	effective control of redness and itching  Instructions for follow-up, activity and diet:	you came in with complaints of redness, pain and swelling of left upper extremity, This was most likely  secondary to dermatitis. Swelling and redness resolved on its own during the course of your admission. You are being discharged on bacitracin ointment to prevent risk of any superimposed bacterial infection.  Secondary Diagnosis:	Congestive heart failure (CHF)  Instructions for follow-up, activity and diet:	You came in with history of CHF. Please continue with your home medications of heart failure.  Secondary Diagnosis:	CAD (coronary artery disease)  Instructions for follow-up, activity and diet:	You came in with hx of CAD. please continue wwith home medications of aspirin, lipitor and metoprolol.  Secondary Diagnosis:	Essential hypertension  Instructions for follow-up, activity and diet:	Please c.w home medications of enalapril and metoprolol.  Secondary Diagnosis:	Paroxysmal ventricular tachycardia  Instructions for follow-up, activity and diet:	Please c.w home dose of coumadin and amiodarone  Secondary Diagnosis:	Mycobacterium avium-intracellulare infection  Instructions for follow-up, activity and diet:	please c.w with your home medications ethambutol and azithromycin as prescribed  Secondary Diagnosis:	Anemia  Instructions for follow-up, activity and diet:	You were found to have iron deficiency anemia on blood work. Please take iron tablets as precribed and follow up with hematology after 1 week. Principal Discharge DX:	Dermatitis  Goal:	effective control of redness and itching  Instructions for follow-up, activity and diet:	you came in with complaints of redness, pain and swelling of left upper extremity, This was most likely  secondary to dermatitis. Swelling and redness resolved on its own during the course of your admission. You are being discharged on bacitracin ointment to prevent risk of any superimposed bacterial infection.  Secondary Diagnosis:	CAD (coronary artery disease)  Instructions for follow-up, activity and diet:	You came in with hx of CAD. please continue wwith home medications of aspirin, lipitor and metoprolol.  Secondary Diagnosis:	Essential hypertension  Instructions for follow-up, activity and diet:	Please c.w home medications of enalapril and metoprolol.  Secondary Diagnosis:	Paroxysmal ventricular tachycardia  Instructions for follow-up, activity and diet:	Please c.w home dose of coumadin and amiodarone  Secondary Diagnosis:	Mycobacterium avium-intracellulare infection  Instructions for follow-up, activity and diet:	please c.w with your home medications ethambutol and azithromycin as prescribed  Secondary Diagnosis:	Anemia  Instructions for follow-up, activity and diet:	You were found to have iron deficiency anemia on blood work. Please take iron tablets as precribed and follow up with hematology after 1 week.  Secondary Diagnosis:	Chronic systolic congestive heart failure  Instructions for follow-up, activity and diet:	please continue with home medication Principal Discharge DX:	Dermatitis  Goal:	effective control of redness and itching  Instructions for follow-up, activity and diet:	you came in with complaints of redness, pain and swelling of left upper extremity, This was most likely  secondary to dermatitis. Swelling and redness resolved on its own during the course of your admission. You are being discharged on bacitracin ointment to prevent risk of any superimposed bacterial infection.  Secondary Diagnosis:	CAD (coronary artery disease)  Instructions for follow-up, activity and diet:	You came in with hx of CAD. please continue wwith home medications of aspirin, lipitor and metoprolol.  Secondary Diagnosis:	Essential hypertension  Instructions for follow-up, activity and diet:	Please c.w home medications of enalapril and metoprolol.  Secondary Diagnosis:	Paroxysmal ventricular tachycardia  Instructions for follow-up, activity and diet:	Please c.w home dose of coumadin and amiodarone  Secondary Diagnosis:	Mycobacterium avium-intracellulare infection  Instructions for follow-up, activity and diet:	please c.w with your home medications ethambutol and azithromycin as prescribed  Secondary Diagnosis:	Anemia  Instructions for follow-up, activity and diet:	You were found to have iron deficiency anemia on blood work. Please take iron tablets as precribed and follow up with hematology after 1 week.  Secondary Diagnosis:	Chronic systolic congestive heart failure  Instructions for follow-up, activity and diet:	please continue with home medications and follow up with your outpatient cardiologist. Principal Discharge DX:	Dermatitis  Goal:	effective control of redness and itching  Instructions for follow-up, activity and diet:	you came in with complaints of redness, pain and swelling of left upper extremity, This was most likely  secondary to dermatitis. Swelling and redness resolved on its own during the course of your admission. You are being discharged on bacitracin ointment to prevent risk of any superimposed bacterial infection.  Secondary Diagnosis:	CAD (coronary artery disease)  Instructions for follow-up, activity and diet:	You came in with hx of CAD. please continue wwith home medications of aspirin, lipitor and metoprolol.  Secondary Diagnosis:	Essential hypertension  Instructions for follow-up, activity and diet:	Please c.w home medications of enalapril and metoprolol.  Secondary Diagnosis:	Paroxysmal ventricular tachycardia  Instructions for follow-up, activity and diet:	Please c.w home dose of coumadin and amiodarone  Secondary Diagnosis:	Mycobacterium avium-intracellulare infection  Instructions for follow-up, activity and diet:	please c.w with your home medications ethambutol and azithromycin as prescribed  Secondary Diagnosis:	Anemia  Instructions for follow-up, activity and diet:	You were found to have iron deficiency anemia on blood work. Please take iron tablets as precribed and follow up with hematology after 1 week.  Secondary Diagnosis:	Chronic systolic congestive heart failure  Instructions for follow-up, activity and diet:	Please continue with home medications: amiodarone, enalapril, metoprolol and spironolactone on a daily basis (as prescribed) and follow up with your outpatient cardiologist, Dr. Alvarenga on Tuesday, November 28 at 2:00PM at her office. Principal Discharge DX:	Dermatitis  Goal:	effective control of redness and itching  Instructions for follow-up, activity and diet:	you came in with complaints of redness, pain and swelling of left upper extremity, This was most likely  secondary to dermatitis. Swelling and redness resolved on its own during the course of your admission. You are being discharged on bacitracin ointment to prevent risk of any superimposed bacterial infection.  Secondary Diagnosis:	CAD (coronary artery disease)  Instructions for follow-up, activity and diet:	You came in with hx of CAD. please continue wwith home medications of aspirin, lipitor and metoprolol.  Secondary Diagnosis:	Essential hypertension  Instructions for follow-up, activity and diet:	Please c.w home medications of enalapril and metoprolol.  Secondary Diagnosis:	Paroxysmal ventricular tachycardia  Instructions for follow-up, activity and diet:	Please c.w home dose of coumadin and amiodarone.  Secondary Diagnosis:	Mycobacterium avium-intracellulare infection  Instructions for follow-up, activity and diet:	please c.w with your home medications ethambutol and azithromycin as prescribed  Secondary Diagnosis:	Anemia  Instructions for follow-up, activity and diet:	You were found to have iron deficiency anemia on blood work. Please take iron tablets as prescribed and follow up with hematology after 1 week.  Secondary Diagnosis:	Chronic systolic congestive heart failure  Instructions for follow-up, activity and diet:	Please continue with home medications: amiodarone, enalapril, metoprolol and spironolactone on as prescribed on a daily basis and follow up with your outpatient cardiologist, Dr. Alvarenga on Tuesday, November 28 at 2:00PM at her office. Principal Discharge DX:	Dermatitis  Goal:	effective control of redness and itching  Instructions for follow-up, activity and diet:	you came in with complaints of redness, pain and swelling of left upper extremity, This was most likely  secondary to dermatitis. Swelling and redness resolved on its own during the course of your admission. You are being discharged on bacitracin ointment to prevent risk of any superimposed bacterial infection.  Secondary Diagnosis:	CAD (coronary artery disease)  Instructions for follow-up, activity and diet:	You came in with hx of CAD. please continue with home medications of aspirin, lipitor and metoprolol.  Secondary Diagnosis:	Essential hypertension  Instructions for follow-up, activity and diet:	Please c.w home medications of enalapril and metoprolol.  Secondary Diagnosis:	Paroxysmal ventricular tachycardia  Instructions for follow-up, activity and diet:	Please c.w home dose of coumadin and amiodarone.  Secondary Diagnosis:	Mycobacterium avium-intracellulare infection  Instructions for follow-up, activity and diet:	please c.w with your home medications ethambutol and azithromycin as prescribed  Secondary Diagnosis:	Anemia  Instructions for follow-up, activity and diet:	You were found to have iron deficiency anemia on blood work. Please take iron tablets as prescribed and follow up with hematology after 1 week.  Secondary Diagnosis:	Chronic systolic congestive heart failure  Instructions for follow-up, activity and diet:	Please continue with home medications: amiodarone, enalapril, metoprolol and spironolactone on as prescribed on a daily basis and follow up with your outpatient cardiologist, Dr. Alvarenga on Tuesday, November 28 at 2:00PM at her office.

## 2017-11-15 NOTE — DISCHARGE NOTE ADULT - PLAN OF CARE
effective control of redness and itching you came in with complaints of redness, pain and swelling of left upper extremity, This was most likely  secondary to dermatitis. Swelling and redness resolved on its own during the course of your admission. You are being discharged on bacitracin ointment to prevent risk of any superimposed bacterial infection. You came in with history of CHF. Please continue with your home medications of heart failure. You came in with hx of CAD. please continue wwith home medications of aspirin, lipitor and metoprolol. Please c.w home medications of enalapril and metoprolol. Please c.w home dose of coumadin and amiodarone please c.w with your home medications ethambutol and azithromycin as prescribed You were found to have iron deficiency anemia on blood work. Please take iron tablets as precribed and follow up with hematology after 1 week. please continue with home medication please continue with home medications and follow up with your outpatient cardiologist. Please continue with home medications: amiodarone, enalapril, metoprolol and spironolactone on a daily basis (as prescribed) and follow up with your outpatient cardiologist, Dr. Alvarenga on Tuesday, November 28 at 2:00PM at her office. Please c.w home dose of coumadin and amiodarone. You were found to have iron deficiency anemia on blood work. Please take iron tablets as prescribed and follow up with hematology after 1 week. Please continue with home medications: amiodarone, enalapril, metoprolol and spironolactone on as prescribed on a daily basis and follow up with your outpatient cardiologist, Dr. Alvarenga on Tuesday, November 28 at 2:00PM at her office. You came in with hx of CAD. please continue with home medications of aspirin, lipitor and metoprolol.

## 2017-11-15 NOTE — DIETITIAN INITIAL EVALUATION ADULT. - NS FNS REASON FOR WEIGHT CHANG
fluid loss/Unable to quantify amount of weight lost. Has fluctuated between 80-95# 2/2 hospitalizations, illnesses; having trouble gaining weight back now/decreased po intake

## 2017-11-15 NOTE — DISCHARGE NOTE ADULT - HOSPITAL COURSE
72 F with PMHx anxiety, CAD s/p PCI, AICD/PPM, CHF, hiatal hernia, HTN, GERD, insomnia,  ventricular tachycardia, spinal stenosis s/p herniated disc repair, and arthritis was BIBA complaining of 1-day history of left upper extremity swelling, redness associated with pain. Patient is known to Dr. Faust and Dr. Alvarenga. Of note, patient has a ?hx of VLADIMIR and was prescribed Ethambutol and Azithromycin by , however has been unable to tolerate medications due to nausea and vomiting.  As per ED physician, the swelling in LUE was very subjective. Vitals in ED were overall unremarkable. Patient underwent a LUE US which was negative. Ordered for Clindamycin, which patient refused. On floors pt was given bacitracin ointment, reported improvement in swelling and redness over a day. During the course of her admission, her labs were significant for iron deficiency anemia and was started on oral ferrous sulfate tabs. pt is clinically asymptomatic, hd stable and is being discharged on her home medications. 72 F with PMHx anxiety, CAD s/p PCI, AICD/PPM, CHF, hiatal hernia, HTN, GERD, insomnia,  ventricular tachycardia, spinal stenosis s/p herniated disc repair, and arthritis was BIBA complaining of 1-day history of left upper extremity swelling, redness associated with pain. Patient is known to Dr. Faust and Dr. Alvarenga. Of note, patient has a ?hx of VLADIMIR and was prescribed Ethambutol and Azithromycin by , however has been unable to tolerate medications due to nausea and vomiting.  As per ED physician, the swelling in LUE was very subjective. Vitals in ED were overall unremarkable. Patient underwent a LUE US which was negative. Ordered for Clindamycin, which patient refused. On floors pt was given bacitracin ointment, reported improvement in swelling and redness over a day. During the course of her admission, her labs were significant for iron deficiency anemia and was started on oral ferrous sulfate tabs. pt is clinically asymptomatic, hd stable and is being discharged on her home medications.   Prior to discharge patient began to experience atypical chest pain. EKG was negative for ischemia, troponins were also negative. Patient was admitted to the cardiac telemetry unit for monitoring. Repeat echo did not show change from previous. Patient was unable to undergo stress testing due to an allergy but given no changes on cardiac echo or EKG with repeatedly negative troponins and hemodynamic stability, patient was deemed safe for discharge with outpatient follow up with her physician, Dr. Alvarenga. Patient was saturating well on room air, intermittently wearing 1L NC of oxygen by preference. Patient refused walking or evaluation with physical therapy to evaluate for decompensation with exercise and was consequently unable receive a prescription for home oxygen. 72 F with PMHx anxiety, CAD s/p PCI, AICD/PPM, CHF, hiatal hernia, HTN, GERD, insomnia,  ventricular tachycardia, spinal stenosis s/p herniated disc repair, and arthritis was BIBA complaining of 1-day history of left upper extremity swelling, redness associated with pain. Patient is known to Dr. Faust and Dr. Alvarenga. Of note, patient has a ?hx of VLADIMIR and was prescribed Ethambutol and Azithromycin by , however has been unable to tolerate medications due to nausea and vomiting.    As per ED physician, the swelling in LUE was very subjective. Vitals in ED were overall unremarkable. Patient underwent a LUE US which was negative. Ordered for Clindamycin, which patient refused. On floors pt was given bacitracin ointment, reported improvement in swelling and redness over a day. During the course of her admission, her labs were significant for iron deficiency anemia and was started on oral ferrous sulfate tabs. pt is clinically asymptomatic, hd stable and is being discharged on her home medications.   Prior to discharge patient began to experience atypical chest pain. EKG was negative for ischemia, troponins were also negative. Patient was admitted to the cardiac telemetry unit for monitoring. Repeat echo did not show change from previous. Repeat lower extremity doppler was NEGATIVE for dvts. Patient was unable to undergo stress testing due to an allergy but given no changes on cardiac echo or EKG with repeatedly negative troponins and hemodynamic stability, patient was deemed safe for discharge with outpatient follow up with her physician, Dr. Alvarenga. Patient was saturating well on room air, intermittently wearing 1L NC of oxygen by preference. Patient refused walking or evaluation with physical therapy to evaluate for decompensation with exercise and was consequently unable receive a prescription for home oxygen. 72 F with PMHx anxiety, CAD s/p PCI, AICD/PPM, CHF, hiatal hernia, HTN, GERD, insomnia,  ventricular tachycardia, spinal stenosis s/p herniated disc repair, and arthritis was BIBA complaining of 1-day history of left upper extremity swelling, redness associated with pain. Patient is known to Dr. Faust and Dr. Alvarenga. Of note, patient has a ?hx of VLADIMIR and was prescribed Ethambutol and Azithromycin by , however has been unable to tolerate medications due to nausea and vomiting.    As per ED physician, the swelling in LUE was very subjective. Vitals in ED were overall unremarkable. Patient underwent a LUE US which was negative. Ordered for Clindamycin, which patient refused. On floors pt was given bacitracin ointment, reported improvement in swelling and redness over a day. During the course of her admission, her labs were significant for iron deficiency anemia and was started on oral ferrous sulfate tabs. pt is clinically asymptomatic, hd stable and is being discharged on her home medications.     Prior to discharge patient began to experience atypical chest pain. EKG was negative for ischemia, troponins were also negative. Patient was admitted to the cardiac telemetry unit for monitoring. Repeat echo did not show change from previous. Repeat lower extremity doppler was ?????NEGATIVE OR POSITIVE???????? for dvts. Patient was unable to undergo stress testing due to an allergy but given no changes on cardiac echo or EKG with repeatedly negative troponins and hemodynamic stability, patient was deemed safe for discharge with outpatient follow up with her physician, Dr. Alvarenga. Patient was saturating well on room air, intermittently wearing 1L NC of oxygen by preference. Patient refused walking or evaluation with physical therapy to evaluate for decompensation with exercise and was consequently unable receive a prescription for home oxygen. 72 F with PMHx anxiety, CAD s/p PCI, AICD/PPM, CHF, hiatal hernia, HTN, GERD, insomnia,  ventricular tachycardia, spinal stenosis s/p herniated disc repair, and arthritis was BIBA complaining of 1-day history of left upper extremity swelling, redness associated with pain. Patient is known to Dr. Faust and Dr. Alvarenga. Of note, patient has a ?hx of VLADIMIR and was prescribed Ethambutol and Azithromycin by , however has been unable to tolerate medications due to nausea and vomiting.    As per ED physician, the swelling in LUE was very subjective. Vitals in ED were overall unremarkable. Patient underwent a LUE US which was negative. Ordered for Clindamycin, which patient refused. On floors pt was given bacitracin ointment, reported improvement in swelling and redness over a day. During the course of her admission, her labs were significant for iron deficiency anemia and was started on oral ferrous sulfate tabs. pt is clinically asymptomatic, hd stable and is being discharged on her home medications.     Prior to discharge patient began to experience atypical chest pain. EKG was negative for ischemia, troponins were also negative. Patient was admitted to the cardiac telemetry unit for monitoring. Repeat echo did not show change from previous. Repeat lower extremity doppler was negative for dvts. Patient was unable to undergo stress testing due to an allergy but given no changes on cardiac echo or EKG with repeatedly negative troponins and hemodynamic stability, patient was deemed safe for discharge with outpatient follow up with her physician, Dr. Alvarenga. Patient was saturating well on room air, intermittently wearing 1L NC of oxygen by preference. Patient refused walking or evaluation with physical therapy to evaluate for decompensation with exercise and was consequently unable receive a prescription for home oxygen. 72 F with PMHx anxiety, CAD s/p PCI, AICD/PPM, CHF, hiatal hernia, HTN, GERD, insomnia,  ventricular tachycardia, spinal stenosis s/p herniated disc repair, and arthritis was BIBA complaining of 1-day history of left upper extremity swelling, redness associated with pain. Patient is known to Dr. Faust and Dr. Alvarenga. Of note, patient has a ?hx of VLADIMIR and was prescribed Ethambutol and Azithromycin by , however has been unable to tolerate medications due to nausea and vomiting.    As per ED physician, the swelling in LUE was very subjective. Vitals in ED were overall unremarkable. Patient underwent a LUE US which was negative. Ordered for Clindamycin, which patient refused. On floors pt was given bacitracin ointment, reported improvement in swelling and redness over a day. During the course of her admission, her labs were significant for iron deficiency anemia and was started on oral ferrous sulfate tabs. pt is clinically asymptomatic, hd stable and is being discharged on her home medications.     Prior to discharge patient began to experience atypical chest pain. EKG was negative for ischemia, troponins were also negative. Patient was admitted to the cardiac telemetry unit for monitoring. Repeat echo did not show change from previous. Repeat lower extremity doppler was negative for dvts. Patient was unable to undergo stress testing due to an allergy but given no changes on cardiac echo or EKG with repeatedly negative troponins and hemodynamic stability, patient was deemed safe for discharge with outpatient follow up with her physician, Dr. Alvarenga. Patient was saturating well on room air, intermittently wearing 1L NC of oxygen by preference. Patient walked with physical therapy and maintained an appropriate oxygen saturation and consequently was not discharged with supplemental oxygen. Patient was seen by psychiatry and pain management. Pain management had no alternative recommendations for pain management outside of standing outpatient fioricet and her lidocain patch. Patient should be mindful of the negative side effects of using fioricet, including rebound headaches as well as liver toxicity given the high amounts of acetaminophen in the medication. Her liver enzymes were normal as was her acetaminophen level this hospital stay. Patient was deemed safe for discharge with outpatient follow up with her doctor, Dr. Alvarenga. Dr. Alvarenga's office will be contacting the patient to make the appointment for two weeks time. 72 F with PMHx anxiety, CAD s/p PCI, AICD/PPM, CHF, hiatal hernia, HTN, GERD, insomnia,  ventricular tachycardia, spinal stenosis s/p herniated disc repair, and arthritis was BIBA complaining of 1-day history of left upper extremity swelling, redness associated with pain. Patient is known to Dr. Faust and Dr. Alvarenga. Of note, patient has a ?hx of VLADIMIR and was prescribed Ethambutol and Azithromycin by , however has been unable to tolerate medications due to nausea and vomiting.    As per ED physician, the swelling in LUE was very subjective. Vitals in ED were overall unremarkable. Patient underwent a LUE US which was negative. Ordered for Clindamycin, which patient refused. On floors pt was given bacitracin ointment, reported improvement in swelling and redness over a day. During the course of her admission, her labs were significant for iron deficiency anemia and was started on oral ferrous sulfate tabs. pt is clinically asymptomatic, hemodynamically stable and is being discharged on her home medications.     Prior to discharge patient began to experience atypical chest pain. EKG was negative for ischemia, troponins were also negative. Patient was admitted to the cardiac telemetry unit for monitoring. Repeat echo did not show change from previous. Repeat lower extremity doppler was negative for dvts. Patient was unable to undergo stress testing due to an allergy but given no changes on cardiac echo or EKG with repeatedly negative troponins and hemodynamic stability, patient was deemed safe for discharge with outpatient follow up with her physician, Dr. Alvarenga. Patient was saturating well on room air, intermittently wearing 1L NC of oxygen by preference. Patient walked with physical therapy and maintained an appropriate oxygen saturation and consequently was not discharged with supplemental oxygen. Patient was seen by psychiatry and pain management. Pain management had no alternative recommendations for pain management outside of standing outpatient fioricet and her lidocain patch. Patient should be mindful of the negative side effects of using fioricet, including rebound headaches as well as liver toxicity given the high amounts of acetaminophen in the medication. Her liver enzymes were normal as was her acetaminophen level this hospital stay. Patient was deemed safe for discharge with outpatient follow up with her doctor, Dr. Alvarenga. Dr. Alvarenga's office will be contacting the patient to make the appointment for two weeks time.

## 2017-11-15 NOTE — CONSULT NOTE ADULT - ASSESSMENT
72 F with PMHx anxiety, CAD s/p PCI, AICD/PPM, CHF, hiatal hernia, HTN, GERD, insomnia,  ventricular tachycardia, spinal stenosis s/p herniated disc repair, and arthritis here for LUE erytthema and subjective swelling concerning for cellulitis    Problem/Plan - 1:  ·  Problem: Edema of upper extremity.  Plan: errythema of LUE, however physical exam is negative for warmth to touch of LUE, no pain in arm currently, and no edema noted. Low suspicion for cellulitis at this time. Unclear etiology of skin color but cannot r/o eczema vs dermatitis.   s/p US of LUE negative for thrombus.  - hold off on abx for now. errythema of LUE concerning for cellulitis, however physical exam is negative for warmth to touch of LUE, no pain in arm currently, and no edema noted. Unclear etiology of skin color but cannot r/o eczema.   s/p US of LUE negative for thrombus.  - hold off on abx for now

## 2017-11-15 NOTE — DISCHARGE NOTE ADULT - COMMUNITY RESOURCES
Return to: [facility], [address] [place] [address], [id number], [place] [address] p: [phone].

## 2017-11-15 NOTE — DISCHARGE NOTE ADULT - OTHER SIGNIFICANT FINDINGS
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 This is a   large scar at the distribution of the left anterior descending coronary artery. The   left ventricular ejection fraction is estimated to be 20-25%  The left atrial   size is normal.The mitral inflow pattern is consistent with impaired left   ventricular relaxation with mildly elevated left atrial pressure   (8-14mmHg).  Right atrial size is normal. The right ventricle is normal in size and   function.  There is a pacemaker wire in the right heart.There is trace   mitral regurgitation.There is mild tricuspid regurgitation.There is moderate  pulmonary hypertension. The pulmonary artery systolic pressure is   estimated to be 51 mmHg.  No aortic root dilatation.The inferior vena cava is normal   in size (<2.1 cm) with normal inspiratory collapse (>50%) consistent with   normal right atrial pressure.  There is no pericardial effusion.  Procedure Details A complete two-dimensional transthoracic echocardiogram was performed (2D,  M-mode, spectral and color flow doppler).  Real time and full volume 3-dimensional imaging performed  Study Quality: Good.  Left Ventricle  Normal left ventricular size and wall thickness.  Large LV aneurysm involves the septum and the apex as well as the apical  adjacent walls. This is a large scar at the distribution of the left   anterior  descending coronary artery. The left ventricular ejection fraction is  estimated to be 20-25%  Left Atrium  The mitral inflow pattern is consistent with impaired left ventricular  relaxation with mildly elevated left atrial pressure (8-14mmHg).  The left atrial size is normal.  The left atrial volume index is 34 cc/m2 (normal <34cc/m2)  Right Atrium  Right atrial size is normal.  Right Ventricle  There is a pacemaker wire in the right heart.  The right ventricle is normal in size and function.  Aortic Valve  Structurally normal aortic valve.  No aortic regurgitation noted.  Mitral Valve  Structurally normal mitral valve.  There is trace mitral regurgitation.  Tricuspid Valve  Structurally normal tricuspid valve.  There is mild tricuspid regurgitation.  The pulmonary artery systolic pressure is estimated to be 51 mmHg.  There is moderate pulmonary hypertension.  Pulmonic Valve  The pulmonic valve is not well visualized.  No pulmonic regurgitation noted.  Arteries and Venous System  No aortic root dilatation.  The inferior vena cava is normal in size (<2.1 cm) with normal inspiratory  collapse (>50%) consistent with normal right atrial pressure.  Pericardium / Pleura  There is no pericardial effusion.

## 2017-11-15 NOTE — CHART NOTE - NSCHARTNOTEFT_GEN_A_CORE
Upon Nutritional Assessment by the Registered Dietitian your patient was determined to meet criteria / has evidence of the following diagnosis/diagnoses:          [ ]  Mild Protein Calorie Malnutrition        [X ]  Moderate Protein Calorie Malnutrition        [ ] Severe Protein Calorie Malnutrition        [ ] Unspecified Protein Calorie Malnutrition        [ ] Underweight / BMI <19        [ ] Morbid Obesity / BMI > 40      Findings as based on:  •  Comprehensive nutrition assessment and consultation  •  Patient education  BMI 14.9  Inadequate energy intake of <50% for >5 days  Loss of muscle mass indicated by hollowing of temporal region and prominent acromion bone process and clavicle bone         Treatment:    The following diet has been recommended:  DASH/TLC diet, with food preferences honored     PROVIDER Section:     By signing this assessment you are acknowledging and agree with the diagnosis/diagnoses assigned by the Registered Dietitian    Comments:

## 2017-11-15 NOTE — DISCHARGE NOTE ADULT - MEDICATION SUMMARY - MEDICATIONS TO TAKE
I will START or STAY ON the medications listed below when I get home from the hospital:    spironolactone 25 mg oral tablet  -- 1 tab(s) by mouth 2 times a day  -- Indication: For Essential hypertension    Fioricet  --  by mouth   -- Indication: For Prophylactic measure    aspirin 81 mg oral tablet  -- 1 tab(s) by mouth once a day  -- Indication: For CAD (coronary artery disease)    amiodarone 100 mg oral tablet  --  by mouth once a day  -- Indication: For Paroxysmal ventricular tachycardia    Coumadin 6 mg oral tablet  -- 1 tab(s) by mouth once a day  -- Indication: For Paroxysmal ventricular tachycardia    Zofran  --  by mouth   -- Indication: For Prophylactic measure    bacitracin-polymyxin B 500 units-10,000 units/g topical ointment  -- 1 application on skin 2 times a day  -- Indication: For Dermatitis    ferrous sulfate 325 mg (65 mg elemental iron) oral delayed release tablet  -- 1 tab(s) by mouth once a day   -- May discolor urine or feces.  Swallow whole.  Do not crush.    -- Indication: For Anemia    NexIUM  --  by mouth   -- Indication: For Prophylactic measure I will START or STAY ON the medications listed below when I get home from the hospital:    spironolactone 25 mg oral tablet  -- 1 tab(s) by mouth 2 times a day  -- Indication: For Essential hypertension    aspirin 81 mg oral tablet  -- 1 tab(s) by mouth once a day  -- Indication: For CAD (coronary artery disease)    Fioricet  -- 2 tab(s) by mouth 4 times a day, As Needed  -- Indication: For Migraine    amiodarone 100 mg oral tablet  --  by mouth once a day  -- Indication: For Paroxysmal ventricular tachycardia    Coumadin 6 mg oral tablet  -- 1 tab(s) by mouth once a day  -- Indication: For Paroxysmal ventricular tachycardia    Zofran  --  by mouth   -- Indication: For Prophylactic measure    metoprolol succinate 25 mg oral tablet, extended release  -- 1 tab(s) by mouth once a day  -- Indication: For Congestive heart failure (CHF)    bacitracin-polymyxin B 500 units-10,000 units/g topical ointment  -- 1 application on skin 2 times a day  -- Indication: For Dermatitis    ferrous sulfate 325 mg (65 mg elemental iron) oral delayed release tablet  -- 1 tab(s) by mouth once a day   -- May discolor urine or feces.  Swallow whole.  Do not crush.    -- Indication: For Anemia    NexIUM  --  by mouth   -- Indication: For Prophylactic measure I will START or STAY ON the medications listed below when I get home from the hospital:    spironolactone 25 mg oral tablet  -- 1 tab(s) by mouth 2 times a day  -- Indication: For Essential hypertension    aspirin 81 mg oral tablet  -- 1 tab(s) by mouth once a day  -- Indication: For CAD (coronary artery disease)    Fioricet  -- 2 tab(s) by mouth 4 times a day, As Needed  -- Indication: For Migraine    amiodarone 100 mg oral tablet  --  by mouth once a day  -- Indication: For Paroxysmal ventricular tachycardia    Coumadin 6 mg oral tablet  -- 1 tab(s) by mouth once a day  -- Indication: For Paroxysmal ventricular tachycardia    Zofran  --  by mouth   -- Indication: For Prophylactic measure    atorvastatin 20 mg oral tablet  -- 1 tab(s) by mouth once a day (at bedtime)  -- Indication: For CAD (coronary artery disease)    metoprolol succinate 25 mg oral tablet, extended release  -- 1 tab(s) by mouth once a day  -- Indication: For Congestive heart failure (CHF)    bacitracin-polymyxin B 500 units-10,000 units/g topical ointment  -- 1 application on skin 2 times a day  -- Indication: For Dermatitis    ferrous sulfate 325 mg (65 mg elemental iron) oral delayed release tablet  -- 1 tab(s) by mouth once a day   -- May discolor urine or feces.  Swallow whole.  Do not crush.    -- Indication: For Anemia    NexIUM  --  by mouth   -- Indication: For Prophylactic measure I will START or STAY ON the medications listed below when I get home from the hospital:    spironolactone 25 mg oral tablet  -- 1 tab(s) by mouth 2 times a day  -- Indication: For Essential hypertension    aspirin 81 mg oral tablet  -- 1 tab(s) by mouth once a day  -- Indication: For CAD (coronary artery disease)    Fioricet  -- 2 tab(s) by mouth 4 times a day, As Needed  -- Indication: For Migraine    amiodarone 100 mg oral tablet  --  by mouth once a day  -- Indication: For Paroxysmal ventricular tachycardia    Coumadin 6 mg oral tablet  -- 1 tab(s) by mouth once a day  -- Indication: For Paroxysmal ventricular tachycardia    Zofran  --  by mouth   -- Indication: For Prophylactic measure    atorvastatin 20 mg oral tablet  -- 1 tab(s) by mouth once a day (at bedtime)  -- Indication: For CAD (coronary artery disease)    metoprolol succinate 25 mg oral tablet, extended release  -- 1 tab(s) by mouth once a day  -- Indication: For Congestive heart failure (CHF)    bacitracin-polymyxin B 500 units-10,000 units/g topical ointment  -- 1 application on skin 2 times a day  -- Indication: For Dermatitis    lidocaine 5% topical film  --  on skin   -- Indication: For Pain    ferrous sulfate 325 mg (65 mg elemental iron) oral delayed release tablet  -- 1 tab(s) by mouth once a day   -- May discolor urine or feces.  Swallow whole.  Do not crush.    -- Indication: For Anemia    NexIUM  --  by mouth   -- Indication: For Prophylactic measure

## 2017-11-15 NOTE — DISCHARGE NOTE ADULT - SECONDARY DIAGNOSIS.
Congestive heart failure (CHF) CAD (coronary artery disease) Essential hypertension Paroxysmal ventricular tachycardia Mycobacterium avium-intracellulare infection Anemia Chronic systolic congestive heart failure

## 2017-11-15 NOTE — DIETITIAN INITIAL EVALUATION ADULT. - PROBLEM SELECTOR PLAN 8
Was prescribed Ethambutol and Azithromycin of unknownn duration by Dr. Faust, however hasn't been taken medications 2/2 GI side effect.  - f/u w/ Dr Faust and Dr. Alvarenga

## 2017-11-15 NOTE — DISCHARGE NOTE ADULT - CARE PROVIDER_API CALL
MD Lyle,   Phone: (   )    -  Fax: (   )    - MD Lyle,   Phone: (   )    -  Fax: (   )    -    Dinorah Alvarenga (MD), Cardiovascular Medicine  [address] [place] [address]  Phone: [phone]  Fax: [phone]

## 2017-11-15 NOTE — DIETITIAN INITIAL EVALUATION ADULT. - PROBLEM SELECTOR PLAN 1
errythema of LUE, however physical exam is negative for warmth to touch of LUE, no pain in arm currently, and no edema noted. Low suspicion for cellulitis at this time. Unclear etiology of skin color but cannot r/o eczema vs dermatitis.   s/p US of LUE negative for thrombus.  - hold off on abx for now

## 2017-11-15 NOTE — CONSULT NOTE ADULT - SUBJECTIVE AND OBJECTIVE BOX
Patient is a 72y old  Female who presents with a chief complaint of LUE swelling and redness (15 Nov 2017 11:58)       HPI:  72 F with PMHx anxiety, CAD s/p PCI, AICD/PPM, CHF, hiatal hernia, HTN, GERD, insomnia,  ventricular tachycardia, spinal stenosis s/p herniated disc repair, and arthritis was BIBA complaining of 1-day history of left upper extremity swelling, redness associated with pain. Denies any fever or chills. Patient also reports some nonspecific symptoms of dyspnea but no chest pain or palpitations. No recent mosquito bites in upper extremity. Patient is known to Dr. Faust and Dr. Alvarenga. Of note, patient has a ?hx of VLADIMIR and was prescribed Ethambutol and Azithromycin by , however has been unable to tolerate medications due to nausea and vomiting.  As per ED physician, the swelling in LUE was very subjective. Vitals in ED were overall unremarkable. Patient underwent a LUE US which was negative. Ordered for Clindamycin, which patient refused. Admitted to [facility] for further evaluation. (14 Nov 2017 23:19)      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  Status post percutaneous transluminal coronary angioplasty: Stented coronary artery  Atherosclerosis of coronary artery: CAD (coronary atherosclerotic disease)  Gastroesophageal reflux disease: GERD (gastroesophageal reflux disease)  Migraine: Migraine  Essential hypertension: Hypertension  Automatic implantable cardiac defibrillator in situ: AICD (automatic cardioverter/defibrillator) present  Old myocardial infarction: MI, old  Other postprocedural status: S/P appendectomy  Automatic implantable cardiac defibrillator in situ: Automatic implantable cardioverter-defibrillator in situ  Status post percutaneous transluminal coronary angioplasty: Stented coronary artery      MEDICATIONS  (STANDING):  amiodarone    Tablet 100 milliGRAM(s) Oral daily  aspirin enteric coated 81 milliGRAM(s) Oral daily  atorvastatin 20 milliGRAM(s) Oral at bedtime  BACItracin/polymyxin B Ointment 1 Application(s) Topical two times a day  enalapril 2.5 milliGRAM(s) Oral daily  ferrous    sulfate 325 milliGRAM(s) Oral daily  LORazepam     Tablet 1 milliGRAM(s) Oral once  metoprolol succinate ER 25 milliGRAM(s) Oral daily  pantoprazole    Tablet 40 milliGRAM(s) Oral before breakfast  spironolactone 25 milliGRAM(s) Oral daily    MEDICATIONS  (PRN):  acetaminophen 325 mG/butalbital 50 mG/caffeine 40 mG 2 Tablet(s) Oral every 6 hours PRN headache      Social History: resident at [facility], no home care services    Functional Level Prior to Admission: ADL independent, walks with a rollator    FAMILY HISTORY:  Family history of malignant neoplasm of trachea, bronchus, and lung: Family history of lung cancer  Family history of cardiovascular disease: Family history of congestive heart failure  Family history of ischemic heart disease (Mother, Aunt): mother, aunts, other members of mother&#x27;s side of family have hx of MI      CBC Full  -  ( 15 Nov 2017 06:11 )  WBC Count : 7.6 K/uL  Hemoglobin : 9.0 g/dL  Hematocrit : 31.4 %  Platelet Count - Automated : 219 K/uL  Mean Cell Volume : 69.6 fL  Mean Cell Hemoglobin : 20.0 pg  Mean Cell Hemoglobin Concentration : 28.7 g/dL  Auto Neutrophil # : x  Auto Lymphocyte # : x  Auto Monocyte # : x  Auto Eosinophil # : x  Auto Basophil # : x  Auto Neutrophil % : x  Auto Lymphocyte % : x  Auto Monocyte % : x  Auto Eosinophil % : x  Auto Basophil % : x      11-15    132<L>  |  97  |  17  ----------------------------<  72  4.1   |  20<L>  |  0.91    Ca    8.9      15 Nov 2017 06:11  Mg     2.2     11-15        Urinalysis Basic - ( 14 Nov 2017 19:33 )    Color: Yellow / Appearance: Clear / SG: <=1.005 / pH: x  Gluc: x / Ketone: NEGATIVE  / Bili: Negative / Urobili: 0.2 E.U./dL   Blood: x / Protein: NEGATIVE mg/dL / Nitrite: NEGATIVE   Leuk Esterase: NEGATIVE / RBC: x / WBC x   Sq Epi: x / Non Sq Epi: x / Bacteria: x          Radiology:    < from: US Duplex Venous Upper Ext Ltd, Left (11.14.17 @ 22:20) >  EXAM:  US DPLX UPR EXT VEINS LTD LT                          PROCEDURE DATE:  11/14/2017                     INTERPRETATION:      VENOUS DUPLEX DOPPLER ULTRASOUND OF THE VEINS OF THE LEFT NECK AND UPPER   EXTREMITY dated 11/14/2017 10:20 PM    INDICATION: Redness, pain, and swelling of the left upper extremity.   Assess for thrombus.     TECHNIQUE: Duplex Doppler evaluation including gray-scale ultrasound   imaging, color flow Doppler imaging, and Doppler spectral analysis of the   veins of theleft neck and left upper extremity was performed.    COMPARISON: None.    FINDINGS: The left internal jugular, subclavian, and axillary veins are   patent and free of thrombus. The visualized left brachiocephalic vein is   patent. The brachial, basilic, and cephalic veins are patent and   compressible. The contralateral subclavian vein is patent and free of   thrombus.      IMPRESSION:   No thrombus seen.    < end of copied text >              Vital Signs Last 24 Hrs  T(C): 36.6 (15 Nov 2017 09:31), Max: 37.1 (14 Nov 2017 23:35)  T(F): 97.9 (15 Nov 2017 09:31), Max: 98.8 (14 Nov 2017 23:35)  HR: 69 (15 Nov 2017 09:31) (66 - 78)  BP: 128/75 (15 Nov 2017 09:31) (125/73 - 168/99)  BP(mean): --  RR: 20 (15 Nov 2017 13:52) (16 - 21)  SpO2: 95% (15 Nov 2017 13:52) (94% - 99%)    REVIEW OF SYSTEMS:    CONSTITUTIONAL: fatigue  EYES: No eye pain, visual disturbances, or discharge  ENMT:  No difficulty hearing, tinnitus, vertigo; No sinus or throat pain  NECK: No pain or stiffness  BREASTS: No pain, masses, or nipple discharge  RESPIRATORY:shortness of breath  CARDIOVASCULAR: No chest pain, palpitations, dizziness, or leg swelling  GASTROINTESTINAL: No abdominal or epigastric pain. No nausea, vomiting, or hematemesis; No diarrhea or constipation. No melena or hematochezia.  GENITOURINARY: No dysuria, frequency, hematuria, or incontinence  NEUROLOGICAL: No headaches, memory loss, loss of strength, numbness, or tremors  SKIN: No itching, burning, rashes, or lesions   LYMPH NODES: No enlarged glands  ENDOCRINE: No heat or cold intolerance; No hair loss  MUSCULOSKELETAL: low back pain  PSYCHIATRIC: No depression, anxiety, mood swings, or difficulty sleeping  HEME/LYMPH: No easy bruising, or bleeding gums  ALLERGY AND IMMUNOLOGIC: No hives or eczema      Physical Exam: frail elderly  woman lying in bed, c/o low back pain    HEENT: normocephalic,  anicteric,  atraumatic    Neck: supple, negative JVD, negative carotid bruits,    Chest: CTA bilaterally, neg wheeze, rhonchi, rales, crackles, egophany    Cardiovascular: regular rate and rhythm, neg murmurs/rubs/gallops    Abdomen: soft, non distended, non tender, negative rebound/guarding, normal bowel sounds, neg hepatosplenomegaly    Extremities: neg LE edema, cold to touch bilateral feet, negative calf tenderness to palpation, negative Case's sign    Spine: kyphoscoliosis, neg ecchymosis, carrol L5-S1 tenderness, neg spasms, neg straight leg raise,     :     Neurologic Exam:    Alert and oriented to person, place, date/year, speech fluent w/o dysarthria, repetition intact, comprehension intact,     Cranial Nerves:     II:                       pupils equal, round and reactive to light, visual fields intact   III/ IV/VI:             extraocular movements intact, neg nystagmus, ptosis  V:                      facial sensation intact, V1-3 normal  VII:                     face symmetric, no droop, normal eye closure and smile  VIII:                    hearing intact to finger rub bilaterally  IX/ X:                  soft palate rise symmetrical  XI:                      head turning, shoulder shrug normal  XII:                     tongue midline    Motor Exam:    Bilateral UE:         4+/5 /intrinsics                            5/5 biceps/triceps/wrist extensors-flexors/deltoid                            negative pronator drift      Bilateral LE:         4/5 hip flexors/adductors/abductors                            4+/5 quadriceps/hamstrings                            4+/5 dorsiflexors/plantar flexors/invertors-evertors    Sensory: intact to LT/PP in all UE/LE dermatomes    DTR:        = biceps/     triceps/     brachioradialis                 = patella/   medial hamstring/    ankle                 neg clonus                 neg Babinski                 neg Hoffmans      Romberg: NT    Tandem Walking: NT    Gait:  NT        PM&R Impression:    1) lumbar myofascial pain  2) lumbar spinal stenosis  3) deconditioned: multi factorial        Recommendations:    1) Physical therapy focusing on therapeutic exercises, bed mobility/transfer out of bed evaluation, progressive ambulation with assistive devices.    2) Disposition Plan: anticipate d/c home, pending functional progress

## 2017-11-16 DIAGNOSIS — I50.20 UNSPECIFIED SYSTOLIC (CONGESTIVE) HEART FAILURE: ICD-10-CM

## 2017-11-16 LAB
INR BLD: 1.61 — HIGH (ref 0.88–1.16)
PROTHROM AB SERPL-ACNC: 18.1 SEC — HIGH (ref 9.8–12.7)
TROPONIN T SERPL-MCNC: <0.01 NG/ML — SIGNIFICANT CHANGE UP (ref 0–0.01)

## 2017-11-16 PROCEDURE — 99233 SBSQ HOSP IP/OBS HIGH 50: CPT | Mod: GC

## 2017-11-16 RX ORDER — ONDANSETRON 8 MG/1
8 TABLET, FILM COATED ORAL ONCE
Qty: 0 | Refills: 0 | Status: COMPLETED | OUTPATIENT
Start: 2017-11-16 | End: 2017-11-16

## 2017-11-16 RX ORDER — WARFARIN SODIUM 2.5 MG/1
7 TABLET ORAL ONCE
Qty: 0 | Refills: 0 | Status: COMPLETED | OUTPATIENT
Start: 2017-11-16 | End: 2017-11-16

## 2017-11-16 RX ORDER — ATORVASTATIN CALCIUM 80 MG/1
1 TABLET, FILM COATED ORAL
Qty: 0 | Refills: 0 | COMMUNITY
Start: 2017-11-16

## 2017-11-16 RX ORDER — WARFARIN SODIUM 2.5 MG/1
6 TABLET ORAL ONCE
Qty: 0 | Refills: 0 | Status: COMPLETED | OUTPATIENT
Start: 2017-11-16 | End: 2017-11-16

## 2017-11-16 RX ORDER — METOPROLOL TARTRATE 50 MG
1 TABLET ORAL
Qty: 0 | Refills: 0 | COMMUNITY
Start: 2017-11-16

## 2017-11-16 RX ADMIN — ONDANSETRON 8 MILLIGRAM(S): 8 TABLET, FILM COATED ORAL at 13:58

## 2017-11-16 RX ADMIN — ONDANSETRON 8 MILLIGRAM(S): 8 TABLET, FILM COATED ORAL at 07:02

## 2017-11-16 RX ADMIN — Medication 2 TABLET(S): at 13:10

## 2017-11-16 RX ADMIN — WARFARIN SODIUM 6 MILLIGRAM(S): 2.5 TABLET ORAL at 18:32

## 2017-11-16 RX ADMIN — Medication 2 TABLET(S): at 17:24

## 2017-11-16 RX ADMIN — PANTOPRAZOLE SODIUM 40 MILLIGRAM(S): 20 TABLET, DELAYED RELEASE ORAL at 13:58

## 2017-11-16 RX ADMIN — Medication 1 MILLIGRAM(S): at 18:32

## 2017-11-16 RX ADMIN — BACITRACIN AND POLYMYXIN B SULFATE 1 APPLICATION(S): 500; 10000 OINTMENT TOPICAL at 18:33

## 2017-11-16 RX ADMIN — AMIODARONE HYDROCHLORIDE 100 MILLIGRAM(S): 400 TABLET ORAL at 13:58

## 2017-11-16 RX ADMIN — Medication 25 MILLIGRAM(S): at 07:38

## 2017-11-16 RX ADMIN — Medication 2 TABLET(S): at 17:30

## 2017-11-16 RX ADMIN — BACITRACIN AND POLYMYXIN B SULFATE 1 APPLICATION(S): 500; 10000 OINTMENT TOPICAL at 06:38

## 2017-11-16 RX ADMIN — PANTOPRAZOLE SODIUM 40 MILLIGRAM(S): 20 TABLET, DELAYED RELEASE ORAL at 06:38

## 2017-11-16 RX ADMIN — Medication 2.5 MILLIGRAM(S): at 07:38

## 2017-11-16 RX ADMIN — Medication 81 MILLIGRAM(S): at 07:38

## 2017-11-16 RX ADMIN — Medication 2 TABLET(S): at 12:23

## 2017-11-16 NOTE — PROGRESS NOTE ADULT - SUBJECTIVE AND OBJECTIVE BOX
INTERVAL HPI/OVERNIGHT EVENTS:Pt was seen and exmained at the bedside. No acute events overnight. Pt was given 24 hr notice yesterday and currently resisting discharge.    VITAL SIGNS:  T(F): 97.9 (11-16-17 @ 12:53)  HR: 76 (11-16-17 @ 12:53)  BP: 121/78 (11-16-17 @ 12:53)  RR: 16 (11-16-17 @ 12:53)  SpO2: 96% (11-16-17 @ 12:53)  Wt(kg): --    PHYSICAL EXAM:    Constitutional: Very anxious and talkative but NAD  Head: NC/AT  Eyes: PERRL, EOMI, clear conjunctiva  ENT: no nasal discharge; uvula midline, no oropharyngeal erythema or exudates; mild dryness of mucous membrane  Neck: supple; no JVD or thyromegaly  Respiratory: CTA B/L; no W/R/R, no retractions  Cardiac: +S1/S2  Gastrointestinal: soft, NT/ND; no rebound or guarding; +BSx4  Extremities: Mild skin redness of LUE but not warm to touch, no edema, b/l LE WWP      MEDICATIONS  (STANDING):  amiodarone    Tablet 100 milliGRAM(s) Oral daily  aspirin enteric coated 81 milliGRAM(s) Oral daily  atorvastatin 20 milliGRAM(s) Oral at bedtime  BACItracin/polymyxin B Ointment 1 Application(s) Topical two times a day  enalapril 2.5 milliGRAM(s) Oral daily  ferrous    sulfate 325 milliGRAM(s) Oral daily  metoprolol succinate ER 25 milliGRAM(s) Oral daily  pantoprazole    Tablet 40 milliGRAM(s) Oral two times a day before meals  spironolactone 25 milliGRAM(s) Oral daily    MEDICATIONS  (PRN):  acetaminophen 325 mG/butalbital 50 mG/caffeine 40 mG 2 Tablet(s) Oral every 4 hours PRN headache  LORazepam     Tablet 1 milliGRAM(s) Oral daily PRN Anxiety      Allergies    codeine (Unknown)  Demerol HCl (Unknown)  nitroglycerin (Unknown)  Percocet 10/325 (Unknown)  sulfa drugs (Unknown)    Intolerances        LABS:                        9.0    7.6   )-----------( 219      ( 15 Nov 2017 06:11 )             31.4     11-15    132<L>  |  97  |  17  ----------------------------<  72  4.1   |  20<L>  |  0.91    Ca    8.9      15 Nov 2017 06:11  Mg     2.2     11-15      PT/INR - ( 16 Nov 2017 07:47 )   PT: 18.1 sec;   INR: 1.61          PTT - ( 14 Nov 2017 19:19 )  PTT:38.0 sec  Urinalysis Basic - ( 14 Nov 2017 19:33 )    Color: Yellow / Appearance: Clear / SG: <=1.005 / pH: x  Gluc: x / Ketone: NEGATIVE  / Bili: Negative / Urobili: 0.2 E.U./dL   Blood: x / Protein: NEGATIVE mg/dL / Nitrite: NEGATIVE   Leuk Esterase: NEGATIVE / RBC: x / WBC x   Sq Epi: x / Non Sq Epi: x / Bacteria: x        RADIOLOGY & ADDITIONAL TESTS: INTERNAL MEDICINE TRANSFER NOTE    Hospital Course: 72 F with PMHx anxiety, CAD s/p PCI, AICD/PPM, CHF, hiatal hernia, HTN, GERD, insomnia,  ventricular tachycardia, spinal stenosis s/p herniated disc repair, and arthritis was BIBA complaining of 1-day history of left upper extremity swelling, redness associated with pain. Patient is known to Dr. Faust and Dr. Alvarenga. Of note, patient has a ?hx of VLADIMIR and was prescribed Ethambutol and Azithromycin by , however has been unable to tolerate medications due to nausea and vomiting.  As per ED physician, the swelling in LUE was very subjective. Vitals in ED were overall unremarkable. Patient underwent a LUE US which was negative. Ordered for Clindamycin, which patient refused. On floors pt was given bacitracin ointment, reported improvement in swelling and redness over a day. During the course of her admission, her labs were significant for iron deficiency anemia and was started on oral ferrous sulfate tabs. Pt remained asymptomatic with plans for discharge on 11/15 however refused discharge and was given 24 hr notice. On 11/16, patient did not appeal however refuse to be discharged and stated that she was having CP and SOB. She discussed with Dr Alvarenga who agreed to take the patient to cardio service and work up the CP while inpatient. Trops drawn, EKG performed. Transferred to [facility]      VITAL SIGNS:  T(F): 97.9 (11-16-17 @ 12:53)  HR: 76 (11-16-17 @ 12:53)  BP: 121/78 (11-16-17 @ 12:53)  RR: 16 (11-16-17 @ 12:53)  SpO2: 96% (11-16-17 @ 12:53)  Wt(kg): --    PHYSICAL EXAM:    Constitutional: Very anxious and talkative but NAD  Head: NC/AT  Eyes: PERRL, EOMI, clear conjunctiva  ENT: no nasal discharge; uvula midline, no oropharyngeal erythema or exudates; mild dryness of mucous membrane  Neck: supple; no JVD or thyromegaly  Respiratory: CTA B/L; no W/R/R, no retractions  Cardiac: +S1/S2  Gastrointestinal: soft, NT/ND; no rebound or guarding; +BSx4  Extremities: Mild skin redness of LUE but not warm to touch, no edema, b/l LE WWP      MEDICATIONS  (STANDING):  amiodarone    Tablet 100 milliGRAM(s) Oral daily  aspirin enteric coated 81 milliGRAM(s) Oral daily  atorvastatin 20 milliGRAM(s) Oral at bedtime  BACItracin/polymyxin B Ointment 1 Application(s) Topical two times a day  enalapril 2.5 milliGRAM(s) Oral daily  ferrous    sulfate 325 milliGRAM(s) Oral daily  metoprolol succinate ER 25 milliGRAM(s) Oral daily  pantoprazole    Tablet 40 milliGRAM(s) Oral two times a day before meals  spironolactone 25 milliGRAM(s) Oral daily    MEDICATIONS  (PRN):  acetaminophen 325 mG/butalbital 50 mG/caffeine 40 mG 2 Tablet(s) Oral every 4 hours PRN headache  LORazepam     Tablet 1 milliGRAM(s) Oral daily PRN Anxiety      Allergies    codeine (Unknown)  Demerol HCl (Unknown)  nitroglycerin (Unknown)  Percocet 10/325 (Unknown)  sulfa drugs (Unknown)    Intolerances        LABS:                        9.0    7.6   )-----------( 219      ( 15 Nov 2017 06:11 )             31.4     11-15    132<L>  |  97  |  17  ----------------------------<  72  4.1   |  20<L>  |  0.91    Ca    8.9      15 Nov 2017 06:11  Mg     2.2     11-15      PT/INR - ( 16 Nov 2017 07:47 )   PT: 18.1 sec;   INR: 1.61          PTT - ( 14 Nov 2017 19:19 )  PTT:38.0 sec  Urinalysis Basic - ( 14 Nov 2017 19:33 )    Color: Yellow / Appearance: Clear / SG: <=1.005 / pH: x  Gluc: x / Ketone: NEGATIVE  / Bili: Negative / Urobili: 0.2 E.U./dL   Blood: x / Protein: NEGATIVE mg/dL / Nitrite: NEGATIVE   Leuk Esterase: NEGATIVE / RBC: x / WBC x   Sq Epi: x / Non Sq Epi: x / Bacteria: x        RADIOLOGY & ADDITIONAL TESTS:

## 2017-11-16 NOTE — PROGRESS NOTE ADULT - PROBLEM SELECTOR PLAN 9
.  F: no IVF  E: replete lytes PRN to maintain K>4, Mg>2  N: NPOpMN for possible stress test in AM, then back to DASH-TLC diet

## 2017-11-16 NOTE — PROGRESS NOTE ADULT - SUBJECTIVE AND OBJECTIVE BOX
PGY-2 CARDIOLOGY SERVICE TRANSFER ACCEPT NOTE    Brief Hospital Course:  Patient is a 73yo F w/ extensive PMH significant for anxiety, CAD s/p PCI, HFrEF (EF20-25% w/ AICD/PPM), h/o VT, HTN who was BIBA complaining of 1-day history of left upper extremity swelling, redness associated with pain. Patient is known to Dr. Faust and Dr. Alvarenga. Of note, patient has a ?hx of VLADIMIR and was prescribed Ethambutol and Azithromycin by , however has been unable to tolerate medications due to nausea and vomiting.  As per ED physician, the swelling in LUE was very subjective. Vitals in ED were overall unremarkable. Patient underwent a LUE US which was negative. Ordered for Clindamycin, which patient refused. On floors pt was given bacitracin ointment, reported improvement in swelling and redness over a day. Thought her LUE sx were 2/2 dermatitis. During the course of her admission, her labs were significant for iron deficiency anemia and was started on oral ferrous sulfate tabs. Pt remains clinically stable and was cleared for discharge from [facility] medicine back to intermediate as of 11/15 however pt refused to leave. 24 hour discharge notice was issued and patient did not appeal. On 11/16, still refused discharge and when medical team said security would be called, patient complained of chest pain -- and called her intermediate saying she was being d/c with CP. Pt also called cardiologist Dr. Alvarenga saying the same. Decision ultimately made by Dr. Alvarenga for patient to be transferred to [facility] cardiac telemetry service to w/u her CP including EKG, troponin, echo and possible pharm stress test. Pending this w/u being negative, she will again be dispositioned back to her intermediate.     SUBJECTIVE: Patient seen and examined at bedside.    OBJECTIVE:    VITAL SIGNS:  ICU Vital Signs Last 24 Hrs  T(C): 36.4 (16 Nov 2017 20:29), Max: 36.9 (16 Nov 2017 10:17)  T(F): 97.6 (16 Nov 2017 20:29), Max: 98.4 (16 Nov 2017 10:17)  HR: 63 (16 Nov 2017 20:29) (63 - 76)  BP: 102/63 (16 Nov 2017 20:29) (102/63 - 136/87)  BP(mean): --  ABP: --  ABP(mean): --  RR: 17 (16 Nov 2017 20:29) (16 - 18)  SpO2: 95% (16 Nov 2017 20:29) (94% - 98%)    CAPILLARY BLOOD GLUCOSE    PHYSICAL EXAM:    Constitutional: resting comfortably in bed, NAD  HEENT: NC/AT; PERRL, anicteric sclera; no oropharyngeal erythema or exudates; MMM  Neck: supple, no JVD  Respiratory: CTA B/L, no W/R/R; respirations appear non-labored, speaking full sentences  Cardiovascular: +S1/S2, RRR  Gastrointestinal: abdomen soft, NT/ND; +BS x4  Extremities: WWP; no clubbing, cyanosis or edema  Vascular: 2+ radial, DP/PT and femoral pulses B/L  Dermatologic: skin normal color and turgor; no visible rashes  Neurological:     MEDICATIONS:  MEDICATIONS  (STANDING):  amiodarone    Tablet 100 milliGRAM(s) Oral daily  aspirin enteric coated 81 milliGRAM(s) Oral daily  atorvastatin 20 milliGRAM(s) Oral at bedtime  BACItracin/polymyxin B Ointment 1 Application(s) Topical two times a day  enalapril 2.5 milliGRAM(s) Oral daily  ferrous    sulfate 325 milliGRAM(s) Oral daily  metoprolol succinate ER 25 milliGRAM(s) Oral daily  pantoprazole    Tablet 40 milliGRAM(s) Oral two times a day before meals  spironolactone 25 milliGRAM(s) Oral daily    MEDICATIONS  (PRN):  acetaminophen 325 mG/butalbital 50 mG/caffeine 40 mG 2 Tablet(s) Oral every 4 hours PRN headache  LORazepam     Tablet 1 milliGRAM(s) Oral daily PRN Anxiety      ALLERGIES:  Allergies    codeine (Unknown)  Demerol HCl (Unknown)  nitroglycerin (Unknown)  Percocet 10/325 (Unknown)  sulfa drugs (Unknown)    Intolerances        LABS:                        9.0    7.6   )-----------( 219      ( 15 Nov 2017 06:11 )             31.4     11-15    132<L>  |  97  |  17  ----------------------------<  72  4.1   |  20<L>  |  0.91    Ca    8.9      15 Nov 2017 06:11  Mg     2.2     11-15      PT/INR - ( 16 Nov 2017 07:47 )   PT: 18.1 sec;   INR: 1.61          RADIOLOGY & ADDITIONAL TESTS: Reviewed. PGY-2 CARDIOLOGY SERVICE TRANSFER ACCEPT NOTE    Brief Hospital Course:  Patient is a 71yo F w/ extensive PMH significant for anxiety, CAD s/p PCI, HFrEF (EF20-25% w/ AICD/PPM), h/o VT, HTN who was BIBA complaining of 1-day history of left upper extremity swelling, redness associated with pain. Patient is known to Dr. Faust and Dr. Alvarenga. Of note, patient has a ?hx of VLADIMIR and was prescribed Ethambutol and Azithromycin by , however has been unable to tolerate medications due to nausea and vomiting.  As per ED physician, the swelling in LUE was very subjective. Vitals in ED were overall unremarkable. Patient underwent a LUE US which was negative. Ordered for Clindamycin, which patient refused. On floors pt was given bacitracin ointment, reported improvement in swelling and redness over a day. Thought her LUE sx were 2/2 dermatitis. During the course of her admission, her labs were significant for iron deficiency anemia and was started on oral ferrous sulfate tabs. Pt remains clinically stable and was cleared for discharge from [facility] medicine back to jail as of 11/15 however pt refused to leave. 24 hour discharge notice was issued and patient did not appeal. On 11/16, still refused discharge and when medical team said security would be called, patient complained of chest pain -- and called her jail saying she was being d/c with CP. Pt also called cardiologist Dr. Alvarenga saying the same. Decision ultimately made by Dr. Alvarenga for patient to be transferred to [facility] cardiac telemetry service to w/u her CP including EKG, troponin, echo and possible pharm stress test. Pending this w/u being negative, she will again be dispositioned back to her jail.     SUBJECTIVE: Patient seen and examined at bedside. Complains of pain of RUE particularly around shoulder region. Pt is pleased she is on telemetry unit now. Still thinks she has major acute problems with her heart despite reassurance of negative troponin.     OBJECTIVE:    VITAL SIGNS:  ICU Vital Signs Last 24 Hrs  T(C): 36.4 (16 Nov 2017 20:29), Max: 36.9 (16 Nov 2017 10:17)  T(F): 97.6 (16 Nov 2017 20:29), Max: 98.4 (16 Nov 2017 10:17)  HR: 63 (16 Nov 2017 20:29) (63 - 76)  BP: 102/63 (16 Nov 2017 20:29) (102/63 - 136/87)  BP(mean): --  ABP: --  ABP(mean): --  RR: 17 (16 Nov 2017 20:29) (16 - 18)  SpO2: 95% (16 Nov 2017 20:29) (94% - 98%)    CAPILLARY BLOOD GLUCOSE    PHYSICAL EXAM:    Constitutional: elderly female resting comfortably in bed, NAD  HEENT: NC/AT; PERRL, anicteric sclera  Neck: supple, no JVD  Respiratory: CTA B/L, no W/R/R  Cardiovascular: +S1/S2, RRR  Gastrointestinal: abdomen soft, NT/ND  Extremities: WWP; no clubbing, cyanosis or edema  Vascular: 2+ radial, DP/PT and femoral pulses B/L  Dermatologic: skin normal color and turgor; no visible rashes  Neurological: AAOx3  Psychiatric: anxious and circumferential    MEDICATIONS:  MEDICATIONS  (STANDING):  amiodarone    Tablet 100 milliGRAM(s) Oral daily  aspirin enteric coated 81 milliGRAM(s) Oral daily  atorvastatin 20 milliGRAM(s) Oral at bedtime  BACItracin/polymyxin B Ointment 1 Application(s) Topical two times a day  enalapril 2.5 milliGRAM(s) Oral daily  ferrous    sulfate 325 milliGRAM(s) Oral daily  metoprolol succinate ER 25 milliGRAM(s) Oral daily  pantoprazole    Tablet 40 milliGRAM(s) Oral two times a day before meals  spironolactone 25 milliGRAM(s) Oral daily    MEDICATIONS  (PRN):  acetaminophen 325 mG/butalbital 50 mG/caffeine 40 mG 2 Tablet(s) Oral every 4 hours PRN headache  LORazepam     Tablet 1 milliGRAM(s) Oral daily PRN Anxiety      ALLERGIES:  Allergies    codeine (Unknown)  Demerol HCl (Unknown)  nitroglycerin (Unknown)  Percocet 10/325 (Unknown)  sulfa drugs (Unknown)    Intolerances        LABS:                        9.0    7.6   )-----------( 219      ( 15 Nov 2017 06:11 )             31.4     11-15    132<L>  |  97  |  17  ----------------------------<  72  4.1   |  20<L>  |  0.91    Ca    8.9      15 Nov 2017 06:11  Mg     2.2     11-15      PT/INR - ( 16 Nov 2017 07:47 )   PT: 18.1 sec;   INR: 1.61          RADIOLOGY & ADDITIONAL TESTS: Reviewed.

## 2017-11-16 NOTE — PROGRESS NOTE ADULT - PROBLEM SELECTOR PLAN 10
.  DVT PPx: coumadin    CODE: FULL    DISPO: transfer to [facility] cardiac telemetry service from [facility]  - patient was refusing to be discharged while on [facility] medicine despite 24hr notice given; per transferring team, patient claimed to have CP as a reason she should not be sent back to [facility]  - will likely need additional 24hr notice once cardiac w/u complete

## 2017-11-16 NOTE — PROGRESS NOTE ADULT - PROBLEM SELECTOR PLAN 1
Suspected etiology of initial LUE complaints; pt had refused ABx at time of admission when concern for cellulitis, now receiving bacitracin empirically  - monitor  - continue empiric bacitracin, maintain angelia wrap for pt comfort

## 2017-11-16 NOTE — PROGRESS NOTE ADULT - PROBLEM SELECTOR PLAN 2
EF20-25% s/p AICD/PPM particularly w/ hx paroxysmal VT; not appearing in acute exacerbation  - home enalapril 2.5mg PO daily  - home toprol XL 25mg PO daily

## 2017-11-16 NOTE — PROGRESS NOTE ADULT - PROBLEM SELECTOR PLAN 10
On warfarin, INR therapeutic On warfarin, INR goal 2-3. Of note, patient was ordered for higher dose of warfarin than is at her home dose 2/2 being subtherapeutic, however patient refused to take and would only be dose 6mg

## 2017-11-16 NOTE — PROGRESS NOTE ADULT - PROBLEM SELECTOR PLAN 1
-errythema of LUE most likely 2/2 dermatits.  -  physical exam is negative for warmth to touch of LUE, no pain in arm currently, and no edema noted.  s/p US of LUE negative for thrombus.  -pt given 24 hr notice but resisting discharge.

## 2017-11-16 NOTE — PROGRESS NOTE ADULT - PROBLEM SELECTOR PLAN 5
s/p AICD/PPM  - maintain continuous cardiac telemetry [facility]  - home amiodarone 100mg PO daily

## 2017-11-16 NOTE — PROGRESS NOTE ADULT - PROBLEM SELECTOR PLAN 6
Microcytic anemia on CBC w/ MCV 69; Hgb stable around 9.0  - continue ferrous sulfate 325mg PO daily  - monitor CBC  - maintain active T&S  - transfuse for Hgb <8

## 2017-11-16 NOTE — PROGRESS NOTE ADULT - PROBLEM SELECTOR PLAN 3
Pt w/ hx of PCI. Claiming to have CP however very low suspicion for ACS given pt's complaints coincided with her refusal to be discharged  - per outpt cardiologist Dr. Alvarenga, pt to be transferred to [facility] cardiac telemetry service for now  - awaiting result of troponin obtained by regional medicine/transferring team  - home ASA 81mg PO daily  - home toprol XL as above  - home atorvastatin 20mg PO qHS

## 2017-11-16 NOTE — PROGRESS NOTE ADULT - PROBLEM SELECTOR PLAN 8
Was prescribed Ethambutol and Azithromycin of unknown duration by Dr. Faust, however hasn't been taken medications 2/2 GI side effects.  - f/u w/ Dr Faust and Dr. Alvarenga as outpatient after discharge

## 2017-11-16 NOTE — PROGRESS NOTE ADULT - ASSESSMENT
This is a 71yo F w/ extensive PMH significant for anxiety, CAD s/p PCI, HFrEF (EF20-25% w/ AICD/PPM), h/o VT, HTN admitted for LUE erythema w/ subjective "swelling" since resolved; suspected etiology 2/2 dermatitis. Patient refused discharge while on regional medical service including refusing to leave with 24hr notice claiming "chest pain"; now being transferred to cardiac telemetry unit for 24 hours of monitoring in anticipation of discharge again.

## 2017-11-17 LAB
INR BLD: 1.86 — HIGH (ref 0.88–1.16)
PROTHROM AB SERPL-ACNC: 20.9 SEC — HIGH (ref 9.8–12.7)

## 2017-11-17 PROCEDURE — 93306 TTE W/DOPPLER COMPLETE: CPT | Mod: 26

## 2017-11-17 PROCEDURE — 93283 PRGRMG EVAL IMPLANTABLE DFB: CPT | Mod: 26

## 2017-11-17 RX ORDER — FUROSEMIDE 40 MG
20 TABLET ORAL DAILY
Qty: 0 | Refills: 0 | Status: DISCONTINUED | OUTPATIENT
Start: 2017-11-17 | End: 2017-11-19

## 2017-11-17 RX ORDER — WARFARIN SODIUM 2.5 MG/1
7 TABLET ORAL ONCE
Qty: 0 | Refills: 0 | Status: COMPLETED | OUTPATIENT
Start: 2017-11-17 | End: 2017-11-17

## 2017-11-17 RX ORDER — ONDANSETRON 8 MG/1
4 TABLET, FILM COATED ORAL ONCE
Qty: 0 | Refills: 0 | Status: COMPLETED | OUTPATIENT
Start: 2017-11-17 | End: 2017-11-17

## 2017-11-17 RX ADMIN — Medication 2 TABLET(S): at 19:39

## 2017-11-17 RX ADMIN — Medication 2.5 MILLIGRAM(S): at 07:05

## 2017-11-17 RX ADMIN — Medication 25 MILLIGRAM(S): at 07:07

## 2017-11-17 RX ADMIN — Medication 2 TABLET(S): at 04:30

## 2017-11-17 RX ADMIN — PANTOPRAZOLE SODIUM 40 MILLIGRAM(S): 20 TABLET, DELAYED RELEASE ORAL at 17:23

## 2017-11-17 RX ADMIN — Medication 20 MILLIGRAM(S): at 20:54

## 2017-11-17 RX ADMIN — Medication 2 TABLET(S): at 23:59

## 2017-11-17 RX ADMIN — Medication 2 TABLET(S): at 03:28

## 2017-11-17 RX ADMIN — PANTOPRAZOLE SODIUM 40 MILLIGRAM(S): 20 TABLET, DELAYED RELEASE ORAL at 06:14

## 2017-11-17 RX ADMIN — ONDANSETRON 4 MILLIGRAM(S): 8 TABLET, FILM COATED ORAL at 02:55

## 2017-11-17 RX ADMIN — Medication 2 TABLET(S): at 09:45

## 2017-11-17 RX ADMIN — Medication 81 MILLIGRAM(S): at 07:05

## 2017-11-17 RX ADMIN — Medication 2 TABLET(S): at 09:04

## 2017-11-17 RX ADMIN — BACITRACIN AND POLYMYXIN B SULFATE 1 APPLICATION(S): 500; 10000 OINTMENT TOPICAL at 06:39

## 2017-11-17 RX ADMIN — Medication 1 TABLET(S): at 13:30

## 2017-11-17 RX ADMIN — Medication 2 TABLET(S): at 18:44

## 2017-11-17 RX ADMIN — WARFARIN SODIUM 7 MILLIGRAM(S): 2.5 TABLET ORAL at 19:38

## 2017-11-17 RX ADMIN — AMIODARONE HYDROCHLORIDE 100 MILLIGRAM(S): 400 TABLET ORAL at 13:17

## 2017-11-17 RX ADMIN — Medication 2 TABLET(S): at 23:48

## 2017-11-17 RX ADMIN — Medication 1 MILLIGRAM(S): at 17:28

## 2017-11-17 NOTE — PROGRESS NOTE ADULT - PROBLEM SELECTOR PLAN 1
EF20-25% s/p AICD/PPM particularly w/ hx paroxysmal VT; not appearing in acute exacerbation  - home enalapril 2.5mg PO daily  - home toprol XL 25mg PO daily  -echo to evaluate for cardiac changes  -currently on supplemental 02 at 1L, unclear if necessary, on per patient preference, will wean patient

## 2017-11-17 NOTE — PROGRESS NOTE ADULT - PROBLEM SELECTOR PLAN 8
.  DVT PPx: coumadin    #discharge  Evaluate walking without oxygen to see if decompensates and requires home oxygen on discharge    CODE: FULL    DISPO: transfer to [facility] cardiac telemetry service from [facility]  - patient was refusing to be discharged while on [facility] medicine despite 24hr notice given; per transferring team, patient claimed to have CP as a reason she should not be sent back to [facility]  - will likely need additional 24hr notice once cardiac w/u complete

## 2017-11-17 NOTE — PROGRESS NOTE ADULT - ASSESSMENT
Patient with dyspnea and chest pressure-  pt with extensive cardiovascular history- history of anterior wall MI and coronary artery stenting;  ischemic cardiomyopathy; S/P ICD; atrial fibrillation.  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  The  cause of pt's shortness of breath is multifactorial-  suspect presently  there is an element of systolic chf- would add low dose lasix and see how she responds.  She had been on lasix as an outpt but stopped it she says because it made her urinate too much.  Her last stress test was in 2011 and I think we should consider repeating on Monday.  The pt may benefit from O2 at home.  I have only known the pt for about two weeks as her previous physician retired and she started seeing me at the end of October.  Aim for INR 2.0-3.0.  Continue telemetry.  Should have CBC and electolytes repeated.  Troponins have been negative.

## 2017-11-17 NOTE — PROGRESS NOTE ADULT - SUBJECTIVE AND OBJECTIVE BOX
EPS Device interrogation    Indication: Pt p/w chest pain / SOB. Asked by team for device check to see if there is any arrhythmia. Pt follows with Dr. Fraga for ICD check.   Last check was on August 2017    Device model: 	Dual chamber ICD ([employer]) -- Teligen 100 E110. 				    Implanting Physician: Dr. Fraga on 8/19/2009    Functioning Mode: DDD ( bpm) 		    Underlying Rhythm: NSR.     Pacemaker dependency:  No  atrial pacing 5%  ventricular pacing < 1%.     Battery status: 3.5 years    Lead parameters:   				  RA lead:    Sense: 1.5     mV.              Threshold:  1.1    V at 0.5   ms.           Impedance: 416      ohms  RV lead:    Sense:  >25    mV.              Threshold: 1.1    V at 0.5   ms.           Impedance: 1086      ohms    Shock coil Impedance: 55 ohms    Normal dual chamber ICD functioning. AFib burden < 1%.  Few episodes of atrial arrhythmia lasted for 7 to 20 seconds.  No AFIB episodes noted since the last diagnostic reset on August 2017.  Episodes of "non-sustained ventricular events" are really just the abovementioned atrial tach with 1:1 conduction. No VT or ICD shock recently (ie from the last reset in august).

## 2017-11-17 NOTE — PROGRESS NOTE ADULT - PROBLEM SELECTOR PLAN 2
Pt w/ hx of PCI. Claiming to have CP however very low suspicion for ACS given pt's complaints coincided with her refusal to be discharged  - per outpt cardiologist Dr. Alvarenga   -troponins negative  - home ASA 81mg PO daily  - home toprol XL as above  - home atorvastatin 20mg PO qHS

## 2017-11-17 NOTE — CHART NOTE - NSCHARTNOTEFT_GEN_A_CORE
Pulmonary fellow note -  Called to clarify pt's pulmonary status.    This is a 71 yo F, who is a known pt of Dr Faust, history taken from out pt office notes.   Started treatment for cavitary VLADIMIR this september with Azithromycin and Ethambutol which she has been unable to tolerate.  It seems that the only curable option for her VLADIMIR would be surgery, triple therapy and an aminoglycoside, none of which she would be able to tolerate.  Given the fact she has severe underlying heart disease it is more likely that she will succumb to her heart disease prior to VLADIMIR. Pulmonary fellow note -  Called to clarify pt's pulmonary status.    This is a 71 yo F, who is a known pt of Dr Faust, history taken from out pt office notes.   Started treatment for cavitary VLADIMIR this september with Azithromycin and Ethambutol which she hasn't been able to tolerate.  It seems that the only curable option for her VLADIMIR would be surgery, triple therapy and an aminoglycoside, none of which she would be able to tolerate.  Given the fact she has severe underlying heart disease it is more likely that she will succumb to her heart disease prior to VLADIMIR.

## 2017-11-17 NOTE — PROGRESS NOTE ADULT - SUBJECTIVE AND OBJECTIVE BOX
INTERVAL HPI/OVERNIGHT EVENTS: No events overnight.     SUBJECTIVE: Patient seen and examined at bedside. Denies sharp chest pain, shortness of breath, syncope, presyncope, palpitations.     OBJECTIVE:    VITAL SIGNS:  ICU Vital Signs Last 24 Hrs  T(C): 36.8 (17 Nov 2017 18:44), Max: 36.9 (17 Nov 2017 09:18)  T(F): 98.3 (17 Nov 2017 18:44), Max: 98.5 (17 Nov 2017 09:18)  HR: 72 (17 Nov 2017 20:45) (60 - 78)  BP: 113/65 (17 Nov 2017 20:45) (100/70 - 113/65)  BP(mean): 86 (17 Nov 2017 17:17) (78 - 93)  ABP: --  ABP(mean): --  RR: 14 (17 Nov 2017 20:45) (14 - 16)  SpO2: 96% (17 Nov 2017 20:45) (96% - 98%)        11-16 @ 07:01  -  11-17 @ 07:00  --------------------------------------------------------  IN: 60 mL / OUT: 950 mL / NET: -890 mL    11-17 @ 07:01  -  11-17 @ 21:36  --------------------------------------------------------  IN: 0 mL / OUT: 300 mL / NET: -300 mL      CAPILLARY BLOOD GLUCOSE      PHYSICAL EXAM:    Constitutional: WDWN resting comfortably in bed; NAD  HEENT: NC/AT; PERRL, anicteric sclera; MMM  Neck: supple, no JVD  Cardiovascular: +S1/S2; RRR; no M/R/G  Respiratory: CTA B/L; no W/R/R; respirations appear non-labored  Gastrointestinal: abdomen soft, NT/ND; +BS x4  Extremities: WWP; no clubbing, cyanosis or edema  Vascular: 2+ radial, femoral, DP/PT pulses B/L  Dermatologic: skin normal color and turgor; no visible rashes  Neurological: AAOx3  Psych; tangential, emotionally extreme    MEDICATIONS:  MEDICATIONS  (STANDING):  amiodarone    Tablet 100 milliGRAM(s) Oral daily  aspirin enteric coated 81 milliGRAM(s) Oral daily  atorvastatin 20 milliGRAM(s) Oral at bedtime  BACItracin/polymyxin B Ointment 1 Application(s) Topical two times a day  enalapril 2.5 milliGRAM(s) Oral daily  ferrous    sulfate 325 milliGRAM(s) Oral daily  furosemide    Tablet 20 milliGRAM(s) Oral daily  metoprolol succinate ER 25 milliGRAM(s) Oral daily  pantoprazole    Tablet 40 milliGRAM(s) Oral two times a day before meals  spironolactone 25 milliGRAM(s) Oral daily    MEDICATIONS  (PRN):  acetaminophen 325 mG/butalbital 50 mG/caffeine 40 mG 2 Tablet(s) Oral every 4 hours PRN headache  LORazepam     Tablet 1 milliGRAM(s) Oral daily PRN Anxiety      ALLERGIES:  Allergies    codeine (Unknown)  Demerol HCl (Unknown)  nitroglycerin (Unknown)  Percocet 10/325 (Unknown)  sulfa drugs (Unknown)    Intolerances        LABS:            PT/INR - ( 17 Nov 2017 06:56 )   PT: 20.9 sec;   INR: 1.86              CARDIAC MARKERS ( 16 Nov 2017 20:10 )  x     / <0.01 ng/mL / x     / x     / x            RADIOLOGY & ADDITIONAL TESTS: Reviewed.    ASSESSMENT:

## 2017-11-17 NOTE — PROGRESS NOTE ADULT - SUBJECTIVE AND OBJECTIVE BOX
Interval Events:  Patient seen and examined at bedside.    Patient is a 72y old  Female who presents with a chief complaint of LUE swelling and redness (15 Nov 2017 11:58)  Pt admitted with possible cellulitis by ER to hospital service-  decided it was dermatitis and was to go home-  then developed chest pressure with ongoing issues with shortness of breath.-  we accepted the pt to my service last nite and transferred her to [facility].      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  Status post percutaneous transluminal coronary angioplasty: Stented coronary artery  Atherosclerosis of coronary artery: CAD (coronary atherosclerotic disease)  Gastroesophageal reflux disease: GERD (gastroesophageal reflux disease)  Migraine: Migraine  Essential hypertension: Hypertension  Automatic implantable cardiac defibrillator in situ: AICD (automatic cardioverter/defibrillator) present  Old myocardial infarction: MI, old  Other postprocedural status: S/P appendectomy  Automatic implantable cardiac defibrillator in situ: Automatic implantable cardioverter-defibrillator in situ  Status post percutaneous transluminal coronary angioplasty: Stented coronary artery      MEDICATIONS:  Pulmonary:    Antimicrobials:    Anticoagulants:  aspirin enteric coated 81 milliGRAM(s) Oral daily    Cardiac:  amiodarone    Tablet 100 milliGRAM(s) Oral daily  enalapril 2.5 milliGRAM(s) Oral daily  metoprolol succinate ER 25 milliGRAM(s) Oral daily  spironolactone 25 milliGRAM(s) Oral daily      Allergies    codeine (Unknown)  Demerol HCl (Unknown)  nitroglycerin (Unknown)  Percocet 10/325 (Unknown)  sulfa drugs (Unknown)    Intolerances        Vital Signs Last 24 Hrs  T(C): 36.8 (17 Nov 2017 13:40), Max: 36.9 (17 Nov 2017 09:18)  T(F): 98.3 (17 Nov 2017 13:40), Max: 98.5 (17 Nov 2017 09:18)  HR: 72 (17 Nov 2017 12:27) (60 - 76)  BP: 100/70 (17 Nov 2017 12:27) (100/70 - 125/74)  BP(mean): 93 (17 Nov 2017 12:27) (78 - 93)  RR: 15 (17 Nov 2017 12:27) (14 - 37)  SpO2: 98% (17 Nov 2017 12:27) (94% - 98%)    11-16 @ 07:01  -  11-17 @ 07:00  --------------------------------------------------------  IN: 60 mL / OUT: 950 mL / NET: -890 mL    11-17 @ 07:01  -  11-17 @ 16:47  --------------------------------------------------------  IN: 0 mL / OUT: 300 mL / NET: -300 mL      Lungs- decreased breath sound bilaterally;  CV-  RRR S1S2;  Ext- no edema.    LABS:              PT/INR - ( 17 Nov 2017 06:56 )   PT: 20.9 sec;   INR: 1.86                              RADIOLOGY & ADDITIONAL STUDIES (The following images were personally reviewed):        Assessment and Plan:

## 2017-11-17 NOTE — PROGRESS NOTE ADULT - ASSESSMENT
This is a 73yo F w/ extensive PMH significant for anxiety, CAD s/p PCI, HFrEF (EF20-25% w/ AICD/PPM), h/o VT, HTN admitted for LUE erythema w/ subjective "swelling" since resolved; suspected etiology 2/2 dermatitis. Patient refused discharge while on regional medical service including refusing to leave with 24hr notice claiming "chest pain"; now being transferred to cardiac telemetry unit for 24 hours of monitoring in anticipation of discharge again.

## 2017-11-18 LAB
ANION GAP SERPL CALC-SCNC: 14 MMOL/L — SIGNIFICANT CHANGE UP (ref 5–17)
BUN SERPL-MCNC: 21 MG/DL — SIGNIFICANT CHANGE UP (ref 7–23)
CALCIUM SERPL-MCNC: 9.2 MG/DL — SIGNIFICANT CHANGE UP (ref 8.4–10.5)
CHLORIDE SERPL-SCNC: 93 MMOL/L — LOW (ref 96–108)
CO2 SERPL-SCNC: 22 MMOL/L — SIGNIFICANT CHANGE UP (ref 22–31)
CREAT SERPL-MCNC: 0.98 MG/DL — SIGNIFICANT CHANGE UP (ref 0.5–1.3)
GLUCOSE SERPL-MCNC: 98 MG/DL — SIGNIFICANT CHANGE UP (ref 70–99)
HCT VFR BLD CALC: 33 % — LOW (ref 34.5–45)
HGB BLD-MCNC: 9.7 G/DL — LOW (ref 11.5–15.5)
INR BLD: 1.78 — HIGH (ref 0.88–1.16)
MAGNESIUM SERPL-MCNC: 2 MG/DL — SIGNIFICANT CHANGE UP (ref 1.6–2.6)
MCHC RBC-ENTMCNC: 20.4 PG — LOW (ref 27–34)
MCHC RBC-ENTMCNC: 29.4 G/DL — LOW (ref 32–36)
MCV RBC AUTO: 69.3 FL — LOW (ref 80–100)
PLATELET # BLD AUTO: 207 K/UL — SIGNIFICANT CHANGE UP (ref 150–400)
POTASSIUM SERPL-MCNC: 4.3 MMOL/L — SIGNIFICANT CHANGE UP (ref 3.5–5.3)
POTASSIUM SERPL-SCNC: 4.3 MMOL/L — SIGNIFICANT CHANGE UP (ref 3.5–5.3)
PROTHROM AB SERPL-ACNC: 20 SEC — HIGH (ref 9.8–12.7)
RBC # BLD: 4.76 M/UL — SIGNIFICANT CHANGE UP (ref 3.8–5.2)
RBC # FLD: 19.2 % — HIGH (ref 10.3–16.9)
SODIUM SERPL-SCNC: 129 MMOL/L — LOW (ref 135–145)
WBC # BLD: 7.5 K/UL — SIGNIFICANT CHANGE UP (ref 3.8–10.5)
WBC # FLD AUTO: 7.5 K/UL — SIGNIFICANT CHANGE UP (ref 3.8–10.5)

## 2017-11-18 RX ORDER — ONDANSETRON 8 MG/1
4 TABLET, FILM COATED ORAL EVERY 6 HOURS
Qty: 0 | Refills: 0 | Status: DISCONTINUED | OUTPATIENT
Start: 2017-11-18 | End: 2017-11-18

## 2017-11-18 RX ORDER — ONDANSETRON 8 MG/1
8 TABLET, FILM COATED ORAL
Qty: 0 | Refills: 0 | Status: DISCONTINUED | OUTPATIENT
Start: 2017-11-18 | End: 2017-11-22

## 2017-11-18 RX ORDER — LIDOCAINE 4 G/100G
1 CREAM TOPICAL
Qty: 0 | Refills: 0 | Status: DISCONTINUED | OUTPATIENT
Start: 2017-11-18 | End: 2017-11-20

## 2017-11-18 RX ORDER — WARFARIN SODIUM 2.5 MG/1
7 TABLET ORAL ONCE
Qty: 0 | Refills: 0 | Status: COMPLETED | OUTPATIENT
Start: 2017-11-18 | End: 2017-11-18

## 2017-11-18 RX ADMIN — Medication 2 TABLET(S): at 22:00

## 2017-11-18 RX ADMIN — Medication 2.5 MILLIGRAM(S): at 06:57

## 2017-11-18 RX ADMIN — PANTOPRAZOLE SODIUM 40 MILLIGRAM(S): 20 TABLET, DELAYED RELEASE ORAL at 16:25

## 2017-11-18 RX ADMIN — LIDOCAINE 1 PATCH: 4 CREAM TOPICAL at 14:22

## 2017-11-18 RX ADMIN — WARFARIN SODIUM 7 MILLIGRAM(S): 2.5 TABLET ORAL at 17:41

## 2017-11-18 RX ADMIN — Medication 2 TABLET(S): at 03:30

## 2017-11-18 RX ADMIN — PANTOPRAZOLE SODIUM 40 MILLIGRAM(S): 20 TABLET, DELAYED RELEASE ORAL at 03:09

## 2017-11-18 RX ADMIN — Medication 20 MILLIGRAM(S): at 10:42

## 2017-11-18 RX ADMIN — BACITRACIN AND POLYMYXIN B SULFATE 1 APPLICATION(S): 500; 10000 OINTMENT TOPICAL at 06:58

## 2017-11-18 RX ADMIN — ONDANSETRON 8 MILLIGRAM(S): 8 TABLET, FILM COATED ORAL at 15:27

## 2017-11-18 RX ADMIN — Medication 2 TABLET(S): at 04:00

## 2017-11-18 RX ADMIN — AMIODARONE HYDROCHLORIDE 100 MILLIGRAM(S): 400 TABLET ORAL at 13:02

## 2017-11-18 RX ADMIN — Medication 2 TABLET(S): at 21:47

## 2017-11-18 RX ADMIN — SPIRONOLACTONE 25 MILLIGRAM(S): 25 TABLET, FILM COATED ORAL at 06:57

## 2017-11-18 RX ADMIN — Medication 2 TABLET(S): at 16:25

## 2017-11-18 RX ADMIN — Medication 2 TABLET(S): at 12:16

## 2017-11-18 RX ADMIN — BACITRACIN AND POLYMYXIN B SULFATE 1 APPLICATION(S): 500; 10000 OINTMENT TOPICAL at 21:52

## 2017-11-18 RX ADMIN — Medication 2 TABLET(S): at 08:55

## 2017-11-18 RX ADMIN — Medication 2 TABLET(S): at 08:09

## 2017-11-18 RX ADMIN — Medication 25 MILLIGRAM(S): at 06:57

## 2017-11-18 RX ADMIN — Medication 2 TABLET(S): at 13:17

## 2017-11-18 RX ADMIN — Medication 2 TABLET(S): at 17:37

## 2017-11-18 NOTE — PROGRESS NOTE ADULT - SUBJECTIVE AND OBJECTIVE BOX
Interval Events:  Patient seen and examined at bedside.    Patient is a 72y old  Female who presents with a chief complaint of LUE swelling and redness (15 Nov 2017 11:58)  Looks better today.  Urinated after lasix last night.      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  Status post percutaneous transluminal coronary angioplasty: Stented coronary artery  Atherosclerosis of coronary artery: CAD (coronary atherosclerotic disease)  Gastroesophageal reflux disease: GERD (gastroesophageal reflux disease)  Migraine: Migraine  Essential hypertension: Hypertension  Automatic implantable cardiac defibrillator in situ: AICD (automatic cardioverter/defibrillator) present  Old myocardial infarction: MI, old  Other postprocedural status: S/P appendectomy  Automatic implantable cardiac defibrillator in situ: Automatic implantable cardioverter-defibrillator in situ  Status post percutaneous transluminal coronary angioplasty: Stented coronary artery      MEDICATIONS:  Pulmonary:    Antimicrobials:    Anticoagulants:  aspirin enteric coated 81 milliGRAM(s) Oral daily  warfarin 7 milliGRAM(s) Oral once    Cardiac:  amiodarone    Tablet 100 milliGRAM(s) Oral daily  enalapril 2.5 milliGRAM(s) Oral daily  furosemide    Tablet 20 milliGRAM(s) Oral daily  metoprolol succinate ER 25 milliGRAM(s) Oral daily  spironolactone 25 milliGRAM(s) Oral daily      Allergies    codeine (Unknown)  Demerol HCl (Unknown)  nitroglycerin (Unknown)  Percocet 10/325 (Unknown)  sulfa drugs (Unknown)    Intolerances        Vital Signs Last 24 Hrs  T(C): 36.7 (18 Nov 2017 09:43), Max: 36.8 (17 Nov 2017 13:40)  T(F): 98.1 (18 Nov 2017 09:43), Max: 98.3 (17 Nov 2017 13:40)  HR: 78 (18 Nov 2017 10:36) (71 - 78)  BP: 102/61 (18 Nov 2017 10:36) (102/61 - 124/73)  BP(mean): 74 (18 Nov 2017 10:36) (74 - 86)  RR: 16 (18 Nov 2017 10:36) (14 - 16)  SpO2: 96% (18 Nov 2017 10:36) (95% - 97%)    11-17 @ 07:01  -  11-18 @ 07:00  --------------------------------------------------------  IN: 0 mL / OUT: 1045 mL / NET: -1045 mL    Lungs- decreased breath sounds b/l;  CV-  RRR S1S2;  Ext- no edema.      LABS:      CBC Full  -  ( 18 Nov 2017 06:47 )  WBC Count : 7.5 K/uL  Hemoglobin : 9.7 g/dL  Hematocrit : 33.0 %  Platelet Count - Automated : 207 K/uL  Mean Cell Volume : 69.3 fL  Mean Cell Hemoglobin : 20.4 pg  Mean Cell Hemoglobin Concentration : 29.4 g/dL  Auto Neutrophil # : x  Auto Lymphocyte # : x  Auto Monocyte # : x  Auto Eosinophil # : x  Auto Basophil # : x  Auto Neutrophil % : x  Auto Lymphocyte % : x  Auto Monocyte % : x  Auto Eosinophil % : x  Auto Basophil % : x    11-18    129<L>  |  93<L>  |  21  ----------------------------<  98  4.3   |  22  |  0.98    Ca    9.2      18 Nov 2017 06:47  Mg     2.0     11-18      PT/INR - ( 18 Nov 2017 06:47 )   PT: 20.0 sec;   INR: 1.78                                    Assessment and Plan:  Systolic CHF acute on chronic; h/o CAD;  S/P ICD; atrial fibrillation;  lung mass.  Continue lasix at current dose.  Please resume pt's lidocaine patch and zofran that she is on as outpt.  Tentative plan for stress test on Monday.  Warfarin 7mg tonite.  Discussed with housestaff and nursing staff.

## 2017-11-18 NOTE — PROGRESS NOTE ADULT - PROBLEM SELECTOR PLAN 8
.  DVT PPx: coumadin    #discharge  Evaluate walking without oxygen to see if decompensates and requires home oxygen on discharge    CODE: FULL    DISPO: transfer to [facility] cardiac telemetry service from [facility]  - patient was refusing to be discharged while on [facility] medicine despite 24hr notice given; per transferring team, patient claimed to have CP as a reason she should not be sent back to [facility]  - will likely need additional 24hr notice once cardiac w/u complete

## 2017-11-18 NOTE — PROGRESS NOTE ADULT - ASSESSMENT
This is a 73yo F w/ extensive PMH significant for anxiety, CAD s/p PCI, HFrEF (EF20-25% w/ AICD/PPM), h/o VT, HTN admitted for LUE erythema w/ subjective "swelling" since resolved; suspected etiology 2/2 dermatitis. Patient refused discharge while on regional medical service including refusing to leave with 24hr notice claiming "chest pain"; now being transferred to cardiac telemetry. Cardiac echo unchanged from previous. Patient on 1L nasal cannula, unclear need. Anticipating stress test for workup of heart failure.

## 2017-11-18 NOTE — PROGRESS NOTE ADULT - PROBLEM SELECTOR PLAN 2
Pt w/ hx of PCI. Claiming to have CP however very low suspicion for ACS given pt's complaints coincided with her refusal to be discharged  - per outpt cardiologist Dr. Alvarenga   -troponins negative, AM trops ordered, will follow up  - home ASA 81mg PO daily  - home toprol XL as above  - home atorvastatin 20mg PO qHS  -stress test anticipated for Monday

## 2017-11-18 NOTE — PROGRESS NOTE ADULT - SUBJECTIVE AND OBJECTIVE BOX
INTERVAL HPI/OVERNIGHT EVENTS:    SUBJECTIVE: Patient seen and examined at bedside.    OBJECTIVE:    VITAL SIGNS:  ICU Vital Signs Last 24 Hrs  T(C): 36.6 (18 Nov 2017 06:38), Max: 36.9 (17 Nov 2017 09:18)  T(F): 97.8 (18 Nov 2017 06:38), Max: 98.5 (17 Nov 2017 09:18)  HR: 73 (18 Nov 2017 06:38) (62 - 78)  BP: 117/68 (18 Nov 2017 06:38) (100/70 - 124/73)  BP(mean): 86 (17 Nov 2017 17:17) (78 - 93)  ABP: --  ABP(mean): --  RR: 14 (18 Nov 2017 06:38) (14 - 16)  SpO2: 95% (18 Nov 2017 06:38) (95% - 98%)        11-17 @ 07:01  -  11-18 @ 07:00  --------------------------------------------------------  IN: 0 mL / OUT: 1045 mL / NET: -1045 mL      CAPILLARY BLOOD GLUCOSE          PHYSICAL EXAM:    Constitutional: WDWN resting comfortably in bed; NAD  HEENT: NC/AT; PERRL, anicteric sclera; MMM  Neck: supple, no JVD  Cardiovascular: +S1/S2; RRR; no M/R/G  Respiratory: CTA B/L; no W/R/R; respirations appear non-labored  Gastrointestinal: abdomen soft, NT/ND; +BS x4  Extremities: WWP; no clubbing, cyanosis or edema  Vascular: 2+ radial, femoral, DP/PT pulses B/L  Dermatologic: skin normal color and turgor; no visible rashes  Neurological:     MEDICATIONS:  MEDICATIONS  (STANDING):  amiodarone    Tablet 100 milliGRAM(s) Oral daily  aspirin enteric coated 81 milliGRAM(s) Oral daily  atorvastatin 20 milliGRAM(s) Oral at bedtime  BACItracin/polymyxin B Ointment 1 Application(s) Topical two times a day  enalapril 2.5 milliGRAM(s) Oral daily  ferrous    sulfate 325 milliGRAM(s) Oral daily  furosemide    Tablet 20 milliGRAM(s) Oral daily  metoprolol succinate ER 25 milliGRAM(s) Oral daily  pantoprazole    Tablet 40 milliGRAM(s) Oral two times a day before meals  spironolactone 25 milliGRAM(s) Oral daily    MEDICATIONS  (PRN):  acetaminophen 325 mG/butalbital 50 mG/caffeine 40 mG 2 Tablet(s) Oral every 4 hours PRN headache  LORazepam     Tablet 1 milliGRAM(s) Oral daily PRN Anxiety      ALLERGIES:  Allergies    codeine (Unknown)  Demerol HCl (Unknown)  nitroglycerin (Unknown)  Percocet 10/325 (Unknown)  sulfa drugs (Unknown)    Intolerances        LABS:                        9.7    7.5   )-----------( 207      ( 18 Nov 2017 06:47 )             33.0             PT/INR - ( 18 Nov 2017 06:47 )   PT: 20.0 sec;   INR: 1.78              CARDIAC MARKERS ( 16 Nov 2017 20:10 )  x     / <0.01 ng/mL / x     / x     / x            RADIOLOGY & ADDITIONAL TESTS: Reviewed.    ASSESSMENT:    PLAN:     CARDIOVASCULAR  #Pump -     #Vessels -     #Electrical -     #Valves -     PULMONARY    RENAL    NEURO    GI/FEN - no IVF; replete lytes prn; NPO    PPx -     LINES -     CODE - FULL.    DISPO - continue intensive level of care on [facility]. INTERVAL HPI/OVERNIGHT EVENTS: no acute events overnight.     SUBJECTIVE: Patient seen and examined at bedside. complains of some shortness of breath this am.     OBJECTIVE:    VITAL SIGNS:  ICU Vital Signs Last 24 Hrs  T(C): 36.6 (18 Nov 2017 06:38), Max: 36.9 (17 Nov 2017 09:18)  T(F): 97.8 (18 Nov 2017 06:38), Max: 98.5 (17 Nov 2017 09:18)  HR: 73 (18 Nov 2017 06:38) (62 - 78)  BP: 117/68 (18 Nov 2017 06:38) (100/70 - 124/73)  BP(mean): 86 (17 Nov 2017 17:17) (78 - 93)  ABP: --  ABP(mean): --  RR: 14 (18 Nov 2017 06:38) (14 - 16)  SpO2: 95% (18 Nov 2017 06:38) (95% - 98%)        11-17 @ 07:01  -  11-18 @ 07:00  --------------------------------------------------------  IN: 0 mL / OUT: 1045 mL / NET: -1045 mL      CAPILLARY BLOOD GLUCOSE          PHYSICAL EXAM:    Constitutional: WDWN resting comfortably in bed; NAD, frail  HEENT: NC/AT; PERRL, anicteric sclera; MMM  Neck: supple, no JVD  Cardiovascular: +S1/S2; RRR; systolic ejection murmur  Respiratory: CTA B/L; no W/R/R; respirations appear non-labored  Gastrointestinal: abdomen soft, NT/ND; +BS x4  Extremities: WWP; no clubbing, cyanosis or edema  Vascular: 2+ radial, femoral, DP/PT pulses B/L  Dermatologic: skin normal color and turgor; no visible rashes  Neurological: AAOx3    MEDICATIONS:  MEDICATIONS  (STANDING):  amiodarone    Tablet 100 milliGRAM(s) Oral daily  aspirin enteric coated 81 milliGRAM(s) Oral daily  atorvastatin 20 milliGRAM(s) Oral at bedtime  BACItracin/polymyxin B Ointment 1 Application(s) Topical two times a day  enalapril 2.5 milliGRAM(s) Oral daily  ferrous    sulfate 325 milliGRAM(s) Oral daily  furosemide    Tablet 20 milliGRAM(s) Oral daily  metoprolol succinate ER 25 milliGRAM(s) Oral daily  pantoprazole    Tablet 40 milliGRAM(s) Oral two times a day before meals  spironolactone 25 milliGRAM(s) Oral daily    MEDICATIONS  (PRN):  acetaminophen 325 mG/butalbital 50 mG/caffeine 40 mG 2 Tablet(s) Oral every 4 hours PRN headache  LORazepam     Tablet 1 milliGRAM(s) Oral daily PRN Anxiety      ALLERGIES:  Allergies    codeine (Unknown)  Demerol HCl (Unknown)  nitroglycerin (Unknown)  Percocet 10/325 (Unknown)  sulfa drugs (Unknown)    Intolerances        LABS:                        9.7    7.5   )-----------( 207      ( 18 Nov 2017 06:47 )             33.0             PT/INR - ( 18 Nov 2017 06:47 )   PT: 20.0 sec;   INR: 1.78              CARDIAC MARKERS ( 16 Nov 2017 20:10 )  x     / <0.01 ng/mL / x     / x     / x            RADIOLOGY & ADDITIONAL TESTS: Reviewed.

## 2017-11-19 DIAGNOSIS — E87.1 HYPO-OSMOLALITY AND HYPONATREMIA: ICD-10-CM

## 2017-11-19 LAB
ANION GAP SERPL CALC-SCNC: 14 MMOL/L — SIGNIFICANT CHANGE UP (ref 5–17)
APTT BLD: 38.2 SEC — HIGH (ref 27.5–37.4)
BUN SERPL-MCNC: 20 MG/DL — SIGNIFICANT CHANGE UP (ref 7–23)
CALCIUM SERPL-MCNC: 9.2 MG/DL — SIGNIFICANT CHANGE UP (ref 8.4–10.5)
CHLORIDE SERPL-SCNC: 92 MMOL/L — LOW (ref 96–108)
CO2 SERPL-SCNC: 22 MMOL/L — SIGNIFICANT CHANGE UP (ref 22–31)
CREAT SERPL-MCNC: 0.93 MG/DL — SIGNIFICANT CHANGE UP (ref 0.5–1.3)
CULTURE RESULTS: SIGNIFICANT CHANGE UP
CULTURE RESULTS: SIGNIFICANT CHANGE UP
GLUCOSE SERPL-MCNC: 92 MG/DL — SIGNIFICANT CHANGE UP (ref 70–99)
HCT VFR BLD CALC: 32.8 % — LOW (ref 34.5–45)
HGB BLD-MCNC: 9.6 G/DL — LOW (ref 11.5–15.5)
INR BLD: 2.35 — HIGH (ref 0.88–1.16)
MAGNESIUM SERPL-MCNC: 2.1 MG/DL — SIGNIFICANT CHANGE UP (ref 1.6–2.6)
MCHC RBC-ENTMCNC: 20.4 PG — LOW (ref 27–34)
MCHC RBC-ENTMCNC: 29.3 G/DL — LOW (ref 32–36)
MCV RBC AUTO: 69.8 FL — LOW (ref 80–100)
PLATELET # BLD AUTO: 217 K/UL — SIGNIFICANT CHANGE UP (ref 150–400)
POTASSIUM SERPL-MCNC: 4.5 MMOL/L — SIGNIFICANT CHANGE UP (ref 3.5–5.3)
POTASSIUM SERPL-SCNC: 4.5 MMOL/L — SIGNIFICANT CHANGE UP (ref 3.5–5.3)
PROTHROM AB SERPL-ACNC: 26.5 SEC — HIGH (ref 9.8–12.7)
RBC # BLD: 4.7 M/UL — SIGNIFICANT CHANGE UP (ref 3.8–5.2)
RBC # FLD: 19.2 % — HIGH (ref 10.3–16.9)
SODIUM SERPL-SCNC: 128 MMOL/L — LOW (ref 135–145)
SPECIMEN SOURCE: SIGNIFICANT CHANGE UP
SPECIMEN SOURCE: SIGNIFICANT CHANGE UP
TROPONIN T SERPL-MCNC: <0.01 NG/ML — SIGNIFICANT CHANGE UP (ref 0–0.01)
WBC # BLD: 6.6 K/UL — SIGNIFICANT CHANGE UP (ref 3.8–10.5)
WBC # FLD AUTO: 6.6 K/UL — SIGNIFICANT CHANGE UP (ref 3.8–10.5)

## 2017-11-19 PROCEDURE — 71010: CPT | Mod: 26

## 2017-11-19 RX ORDER — WARFARIN SODIUM 2.5 MG/1
5 TABLET ORAL ONCE
Qty: 0 | Refills: 0 | Status: COMPLETED | OUTPATIENT
Start: 2017-11-19 | End: 2017-11-19

## 2017-11-19 RX ORDER — PETROLATUM,WHITE
1 JELLY (GRAM) TOPICAL THREE TIMES A DAY
Qty: 0 | Refills: 0 | Status: DISCONTINUED | OUTPATIENT
Start: 2017-11-19 | End: 2017-11-22

## 2017-11-19 RX ADMIN — BACITRACIN AND POLYMYXIN B SULFATE 1 APPLICATION(S): 500; 10000 OINTMENT TOPICAL at 03:00

## 2017-11-19 RX ADMIN — Medication 1 MILLIGRAM(S): at 17:16

## 2017-11-19 RX ADMIN — Medication 20 MILLIGRAM(S): at 12:24

## 2017-11-19 RX ADMIN — Medication 2 TABLET(S): at 09:19

## 2017-11-19 RX ADMIN — ONDANSETRON 8 MILLIGRAM(S): 8 TABLET, FILM COATED ORAL at 09:11

## 2017-11-19 RX ADMIN — Medication 2 TABLET(S): at 09:11

## 2017-11-19 RX ADMIN — PANTOPRAZOLE SODIUM 40 MILLIGRAM(S): 20 TABLET, DELAYED RELEASE ORAL at 03:09

## 2017-11-19 RX ADMIN — Medication 2 TABLET(S): at 03:07

## 2017-11-19 RX ADMIN — Medication 2 TABLET(S): at 12:55

## 2017-11-19 RX ADMIN — Medication 2 TABLET(S): at 13:00

## 2017-11-19 RX ADMIN — ONDANSETRON 8 MILLIGRAM(S): 8 TABLET, FILM COATED ORAL at 03:00

## 2017-11-19 RX ADMIN — PANTOPRAZOLE SODIUM 40 MILLIGRAM(S): 20 TABLET, DELAYED RELEASE ORAL at 17:23

## 2017-11-19 RX ADMIN — WARFARIN SODIUM 5 MILLIGRAM(S): 2.5 TABLET ORAL at 16:55

## 2017-11-19 RX ADMIN — Medication 2.5 MILLIGRAM(S): at 07:21

## 2017-11-19 RX ADMIN — Medication 2 TABLET(S): at 04:00

## 2017-11-19 RX ADMIN — Medication 2 TABLET(S): at 18:24

## 2017-11-19 RX ADMIN — Medication 25 MILLIGRAM(S): at 07:19

## 2017-11-19 RX ADMIN — BACITRACIN AND POLYMYXIN B SULFATE 1 APPLICATION(S): 500; 10000 OINTMENT TOPICAL at 21:14

## 2017-11-19 RX ADMIN — AMIODARONE HYDROCHLORIDE 100 MILLIGRAM(S): 400 TABLET ORAL at 12:54

## 2017-11-19 RX ADMIN — Medication 81 MILLIGRAM(S): at 07:22

## 2017-11-19 RX ADMIN — LIDOCAINE 1 PATCH: 4 CREAM TOPICAL at 07:06

## 2017-11-19 RX ADMIN — Medication 2 TABLET(S): at 16:55

## 2017-11-19 NOTE — PROGRESS NOTE ADULT - SUBJECTIVE AND OBJECTIVE BOX
Interval Events:  Patient seen and examined at bedside.    Patient is a 72y old  Female who presents with a chief complaint of LUE swelling and redness (15 Nov 2017 11:58)  SOB and chest heaviness unchanged.  O2 sats are good today on room air.      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  Status post percutaneous transluminal coronary angioplasty: Stented coronary artery  Atherosclerosis of coronary artery: CAD (coronary atherosclerotic disease)  Gastroesophageal reflux disease: GERD (gastroesophageal reflux disease)  Migraine: Migraine  Essential hypertension: Hypertension  Automatic implantable cardiac defibrillator in situ: AICD (automatic cardioverter/defibrillator) present  Old myocardial infarction: MI, old  Other postprocedural status: S/P appendectomy  Automatic implantable cardiac defibrillator in situ: Automatic implantable cardioverter-defibrillator in situ  Status post percutaneous transluminal coronary angioplasty: Stented coronary artery      MEDICATIONS:  Pulmonary:    Antimicrobials:    Anticoagulants:  aspirin enteric coated 81 milliGRAM(s) Oral daily    Cardiac:  amiodarone    Tablet 100 milliGRAM(s) Oral daily  enalapril 2.5 milliGRAM(s) Oral daily  metoprolol succinate ER 25 milliGRAM(s) Oral daily  spironolactone 25 milliGRAM(s) Oral daily      Allergies    codeine (Unknown)  Demerol HCl (Unknown)  nitroglycerin (Unknown)  Percocet 10/325 (Unknown)  sulfa drugs (Unknown)    Intolerances        Vital Signs Last 24 Hrs  T(C): 37.2 (19 Nov 2017 13:51), Max: 37.2 (19 Nov 2017 13:51)  T(F): 99 (19 Nov 2017 13:51), Max: 99 (19 Nov 2017 13:51)  HR: 70 (19 Nov 2017 13:51) (60 - 70)  BP: 93/63 (19 Nov 2017 13:51) (93/63 - 108/71)  BP(mean): 88 (19 Nov 2017 09:35) (74 - 88)  RR: 16 (19 Nov 2017 13:51) (16 - 16)  SpO2: 100% (19 Nov 2017 13:51) (94% - 100%)    11-18 @ 07:01 - 11-19 @ 07:00  --------------------------------------------------------  IN: 220 mL / OUT: 970 mL / NET: -750 mL    11-19 @ 07:01  - 11-19 @ 18:39  --------------------------------------------------------  IN: 0 mL / OUT: 450 mL / NET: -450 mL          LABS:      CBC Full  -  ( 19 Nov 2017 06:51 )  WBC Count : 6.6 K/uL  Hemoglobin : 9.6 g/dL  Hematocrit : 32.8 %  Platelet Count - Automated : 217 K/uL  Mean Cell Volume : 69.8 fL  Mean Cell Hemoglobin : 20.4 pg  Mean Cell Hemoglobin Concentration : 29.3 g/dL  Auto Neutrophil # : x  Auto Lymphocyte # : x  Auto Monocyte # : x  Auto Eosinophil # : x  Auto Basophil # : x  Auto Neutrophil % : x  Auto Lymphocyte % : x  Auto Monocyte % : x  Auto Eosinophil % : x  Auto Basophil % : x    11-19    128<L>  |  92<L>  |  20  ----------------------------<  92  4.5   |  22  |  0.93    Ca    9.2      19 Nov 2017 06:51  Mg     2.1     11-19      PT/INR - ( 19 Nov 2017 06:51 )   PT: 26.5 sec;   INR: 2.35          PTT - ( 19 Nov 2017 06:51 )  PTT:38.2 sec                            Assessment and Plan:  I reviewed patient's history jerzy with her.  She apparently had a untoward reaction to pharmaceutical stress test in past("blackout")  She cannot exercise on a treadmill.  I think we should cancel stress test in this pt-  she would not agree to further intervention if positive anyway.  Discussed with medical  resident jerzy who will cancel test.  To have lower ext venous dopplers.  Physical therapy should see her and we should assess for any need for home O2.  I suggested subacute rehab to her- she is reluctant.  Agree with maranda/c lasix.

## 2017-11-19 NOTE — PROGRESS NOTE ADULT - PROBLEM SELECTOR PLAN 5
Pt w/ severe anxiety. On Lorazepam 1mg PO at home.  - continue home lorazepam 1mg PO daily PRN BPs currently stable/normotensive  - home enalapril as above  - home toprol XL as above

## 2017-11-19 NOTE — PROGRESS NOTE ADULT - SUBJECTIVE AND OBJECTIVE BOX
INTERVAL HPI/OVERNIGHT EVENTS: no acute events overnight.     SUBJECTIVE: Patient seen and examined at bedside. complains of continued SOB and chest tightness this AM that is unchanged for the past 3 months. Patient is upset that people keep asking her questions, stating it is exacerbating her heart failure. She is upset regarding the timing of her medications, stating she needs them to be ready at exactly the time she takes them at home. She reports a persistent migraine headache, nausea, SOB, chest pain, and rib pain. Does not believe me when I say that her workup shows no active cardiac ischemia so far, stating I am just a trainee. Called her nurse incompetent while she was in the room, stating that she was giving her iron when it was not ordered by her cardiologist.     Tele: NSR, occasional PVCs    OBJECTIVE:    Vital Signs Last 24 Hrs  T(C): 37.2 (19 Nov 2017 13:51), Max: 37.2 (19 Nov 2017 13:51)  T(F): 99 (19 Nov 2017 13:51), Max: 99 (19 Nov 2017 13:51)  HR: 70 (19 Nov 2017 13:51) (60 - 70)  BP: 93/63 (19 Nov 2017 13:51) (93/63 - 108/71)  BP(mean): 88 (19 Nov 2017 09:35) (74 - 88)  RR: 16 (19 Nov 2017 13:51) (16 - 16)  SpO2: 100% (19 Nov 2017 13:51) (94% - 100%)    I&O's Detail    18 Nov 2017 07:01  -  19 Nov 2017 07:00  --------------------------------------------------------  IN:    Oral Fluid: 220 mL  Total IN: 220 mL    OUT:    Voided: 970 mL  Total OUT: 970 mL    Total NET: -750 mL      19 Nov 2017 07:01  -  19 Nov 2017 17:14  --------------------------------------------------------  IN:  Total IN: 0 mL    OUT:    Voided: 450 mL  Total OUT: 450 mL    Total NET: -450 mL      CAPILLARY BLOOD GLUCOSE      PHYSICAL EXAM:    Constitutional: frail elderly F resting comfortably in bed; NAD, slightly agitated  HEENT: NC/AT; PERRL, anicteric sclera; MMM  Neck: supple, no JVD  Cardiovascular: +S1/S2; RRR; no M/G/R  Respiratory: CTA B/L; no W/R/R; respirations appear non-labored  Gastrointestinal: abdomen soft, NT/ND; +BS x4  Extremities: WWP; no clubbing, cyanosis or edema  Vascular: 2+ radial, DP/PT pulses B/L  Dermatologic: skin normal color and turgor; no visible rashes  Neurological: AAOx3; moving all extremities equally  Psych: pressured speech; tangential in thought    MEDICATIONS  (STANDING):  amiodarone    Tablet 100 milliGRAM(s) Oral daily  aspirin enteric coated 81 milliGRAM(s) Oral daily  BACItracin/polymyxin B Ointment 1 Application(s) Topical two times a day  enalapril 2.5 milliGRAM(s) Oral daily  ferrous    sulfate 325 milliGRAM(s) Oral daily  lidocaine   Patch 1 Patch Transdermal every 72 hours  metoprolol succinate ER 25 milliGRAM(s) Oral daily  pantoprazole    Tablet 40 milliGRAM(s) Oral two times a day before meals  spironolactone 25 milliGRAM(s) Oral daily    MEDICATIONS  (PRN):  acetaminophen 325 mG/butalbital 50 mG/caffeine 40 mG 2 Tablet(s) Oral every 4 hours PRN headache  LORazepam     Tablet 1 milliGRAM(s) Oral daily PRN Anxiety  ondansetron    Tablet 8 milliGRAM(s) Oral two times a day PRN Nausea and/or Vomiting    ALLERGIES:  Allergies    codeine (Unknown)  Demerol HCl (Unknown)  nitroglycerin (Unknown)  Percocet 10/325 (Unknown)  sulfa drugs (Unknown)    Intolerances        LABS:                                   9.6    6.6   )-----------( 217      ( 19 Nov 2017 06:51 )             32.8       11-19    128<L>  |  92<L>  |  20  ----------------------------<  92  4.5   |  22  |  0.93    Ca    9.2      19 Nov 2017 06:51  Mg     2.1     11-19          CARDIAC MARKERS ( 16 Nov 2017 20:10 )  x     / <0.01 ng/mL / x     / x     / x            RADIOLOGY & ADDITIONAL TESTS: Reviewed. INTERVAL HPI/OVERNIGHT EVENTS: no acute events overnight.     SUBJECTIVE: Patient seen and examined at bedside. complains of continued SOB and chest tightness this AM that is unchanged for the past 3 months. Patient is upset that people keep asking her questions, stating it is exacerbating her heart failure. She is upset regarding the timing of her medications, stating she needs them to be ready at exactly the time she takes them at home. She reports a persistent migraine headache, nausea, SOB, chest pain, and rib pain. Does not believe me when I reassure her that her workup shows no active cardiac ischemia so far, stating I am just a trainee. Called her nurse incompetent while she was in the room, stating that she was giving her iron when it was not ordered.     Tele: NSR, occasional PVCs    OBJECTIVE:    Vital Signs Last 24 Hrs  T(C): 37.2 (19 Nov 2017 13:51), Max: 37.2 (19 Nov 2017 13:51)  T(F): 99 (19 Nov 2017 13:51), Max: 99 (19 Nov 2017 13:51)  HR: 70 (19 Nov 2017 13:51) (60 - 70)  BP: 93/63 (19 Nov 2017 13:51) (93/63 - 108/71)  BP(mean): 88 (19 Nov 2017 09:35) (74 - 88)  RR: 16 (19 Nov 2017 13:51) (16 - 16)  SpO2: 100% (19 Nov 2017 13:51) (94% - 100%)    I&O's Detail    18 Nov 2017 07:01  -  19 Nov 2017 07:00  --------------------------------------------------------  IN:    Oral Fluid: 220 mL  Total IN: 220 mL    OUT:    Voided: 970 mL  Total OUT: 970 mL    Total NET: -750 mL      19 Nov 2017 07:01  -  19 Nov 2017 17:14  --------------------------------------------------------  IN:  Total IN: 0 mL    OUT:    Voided: 450 mL  Total OUT: 450 mL    Total NET: -450 mL      CAPILLARY BLOOD GLUCOSE      PHYSICAL EXAM:    Constitutional: frail elderly F resting comfortably in bed; NAD, slightly agitated  HEENT: NC/AT; PERRL, anicteric sclera; MMM  Neck: supple, no JVD  Cardiovascular: +S1/S2; RRR; no M/G/R  Respiratory: CTA B/L; no W/R/R; respirations appear non-labored  Gastrointestinal: abdomen soft, NT/ND; +BS x4  Extremities: WWP; no clubbing, cyanosis or edema  Vascular: 2+ radial, DP/PT pulses B/L  Dermatologic: skin normal color and turgor; no visible rashes  Neurological: AAOx3; moving all extremities equally  Psych: pressured speech; tangential in thought    MEDICATIONS  (STANDING):  amiodarone    Tablet 100 milliGRAM(s) Oral daily  aspirin enteric coated 81 milliGRAM(s) Oral daily  BACItracin/polymyxin B Ointment 1 Application(s) Topical two times a day  enalapril 2.5 milliGRAM(s) Oral daily  ferrous    sulfate 325 milliGRAM(s) Oral daily  lidocaine   Patch 1 Patch Transdermal every 72 hours  metoprolol succinate ER 25 milliGRAM(s) Oral daily  pantoprazole    Tablet 40 milliGRAM(s) Oral two times a day before meals  spironolactone 25 milliGRAM(s) Oral daily    MEDICATIONS  (PRN):  acetaminophen 325 mG/butalbital 50 mG/caffeine 40 mG 2 Tablet(s) Oral every 4 hours PRN headache  LORazepam     Tablet 1 milliGRAM(s) Oral daily PRN Anxiety  ondansetron    Tablet 8 milliGRAM(s) Oral two times a day PRN Nausea and/or Vomiting    ALLERGIES:  Allergies    codeine (Unknown)  Demerol HCl (Unknown)  nitroglycerin (Unknown)  Percocet 10/325 (Unknown)  sulfa drugs (Unknown)    Intolerances        LABS:                                   9.6    6.6   )-----------( 217      ( 19 Nov 2017 06:51 )             32.8       11-19    128<L>  |  92<L>  |  20  ----------------------------<  92  4.5   |  22  |  0.93    Ca    9.2      19 Nov 2017 06:51  Mg     2.1     11-19          CARDIAC MARKERS ( 16 Nov 2017 20:10 )  x     / <0.01 ng/mL / x     / x     / x            RADIOLOGY & ADDITIONAL TESTS: EKG ordered, CXR without new infiltrate, effusion, or congestion. Persistent JACOB opacification unchanged.

## 2017-11-19 NOTE — PROGRESS NOTE ADULT - PROBLEM SELECTOR PLAN 3
s/p AICD/PPM  - home amiodarone 100mg PO daily  - EP to interrogate AICD s/p AICD/PPM  - home amiodarone 100mg PO daily  - EP interrogated AICD: "Normal dual chamber ICD functioning. AFib burden < 1%.  Few episodes of atrial arrhythmia lasted for 7 to 20 seconds.  No AFIB episodes noted since the last diagnostic reset on August 2017.  Episodes of "non-sustained ventricular events" are really just the abovementioned atrial tach with 1:1 conduction. No VT or ICD shock recently (ie from the last reset in august). "

## 2017-11-19 NOTE — PROGRESS NOTE ADULT - PROBLEM SELECTOR PLAN 4
BPs currently stable/normotensive  - home enalapril as above  - home toprol XL as above hyponatremia slightly worsening in setting of active diuresis. Patient clinically dry-appearing. Patient persistently net negative.    - Discussed with cardiologist, will hold AM lasix dose tomorrow and continue to trend.   - will check serum osm, urine lytes

## 2017-11-19 NOTE — PROGRESS NOTE ADULT - ASSESSMENT
This is a 73yo F w/ extensive PMH significant for anxiety, CAD s/p PCI, HFrEF (EF20-25% w/ AICD/PPM), h/o VT, HTN admitted for LUE erythema w/ subjective "swelling" since resolved; suspected etiology 2/2 dermatitis. Patient refused discharge while on regional medical service including refusing to leave with 24hr notice claiming "chest pain"; now being transferred to cardiac telemetry. Cardiac echo unchanged from previous. Patient on 1L nasal cannula, unclear need. Anticipating stress test for workup of heart failure. This is a 71yo F w/ extensive PMH significant for anxiety, CAD s/p PCI, HFrEF (EF20-25% w/ AICD/PPM), h/o VT, HTN admitted for LUE erythema w/ subjective swelling since resolved; suspected etiology 2/2 dermatitis c/b development of chest pain now being transferred to cardiac telemetry, with negative workup for ACS to date, pending cardiac stress testing.

## 2017-11-19 NOTE — PROGRESS NOTE ADULT - PROBLEM SELECTOR PLAN 6
Was prescribed Ethambutol and Azithromycin of unknown duration by Dr. Faust, however hasn't been taken medications 2/2 GI side effects.  - f/u w/ Dr Faust and Dr. Alvarenga as outpatient after discharge Pt w/ severe anxiety. On Lorazepam 1mg PO at home.  - continue home lorazepam 1mg PO daily PRN

## 2017-11-19 NOTE — PROGRESS NOTE ADULT - PROBLEM SELECTOR PLAN 2
Pt w/ hx of PCI. Claiming to have CP however very low suspicion for ACS given pt's complaints coincided with her refusal to be discharged  - per outpt cardiologist Dr. Alvarenga   -troponins negative, AM trops ordered, will follow up  - home ASA 81mg PO daily  - home toprol XL as above  - home atorvastatin 20mg PO qHS  -stress test anticipated for Monday Pt w/ hx of PCI. Claiming to have CP however uncertain regarding timeline as patient has reported different chronology to different physicians. No elevated troponin/EKG changes to date.    - NPO after midnight for pharmacologic stress in AM  - troponins negative x2  - home ASA 81mg PO daily  - home toprol XL as above  - home atorvastatin 20mg PO qHS

## 2017-11-19 NOTE — PROGRESS NOTE ADULT - PROBLEM SELECTOR PLAN 8
.  DVT PPx: coumadin    #discharge  Evaluate walking without oxygen to see if decompensates and requires home oxygen on discharge    CODE: FULL    DISPO: transfer to [facility] cardiac telemetry service from [facility]  - patient was refusing to be discharged while on [facility] medicine despite 24hr notice given; per transferring team, patient claimed to have CP as a reason she should not be sent back to [facility]  - will likely need additional 24hr notice once cardiac w/u complete F: no IVF  E: replete lytes PRN to maintain K>4, Mg>2  N: NPOpMN for possible stress test in AM, then back to DASH-TLC diet

## 2017-11-19 NOTE — PROGRESS NOTE ADULT - PROBLEM SELECTOR PLAN 7
.  F: no IVF  E: replete lytes PRN to maintain K>4, Mg>2  N: NPOpMN for possible stress test in AM, then back to DASH-TLC diet Was prescribed Ethambutol and Azithromycin of unknown duration by Dr. Faust, however hasn't been taken medications 2/2 GI side effects.  - per pulm note: "Started treatment for cavitary VLADIMIR this september with Azithromycin and Ethambutol which she hasn't been able to tolerate.  It seems that the only curable option for her VLADIMIR would be surgery, triple therapy and an aminoglycoside, none of which she would be able to tolerate.  Given the fact she has severe underlying heart disease it is more likely that she will succumb to her heart disease prior to VLADIMIR."  - f/u w/ Dr Faust and Dr. Alvarenga as outpatient after discharge

## 2017-11-19 NOTE — PROGRESS NOTE ADULT - PROBLEM SELECTOR PLAN 9
.  DVT PPx: coumadin    CODE: FULL    DISPO: continue monitoring on [facility]; likely DC to [facility] pending stress and PT eval

## 2017-11-20 ENCOUNTER — APPOINTMENT (OUTPATIENT)
Dept: PULMONOLOGY | Facility: CLINIC | Age: 72
End: 2017-11-20

## 2017-11-20 DIAGNOSIS — M79.602 PAIN IN LEFT ARM: ICD-10-CM

## 2017-11-20 LAB
ALBUMIN SERPL ELPH-MCNC: 4.2 G/DL — SIGNIFICANT CHANGE UP (ref 3.3–5)
ALP SERPL-CCNC: 78 U/L — SIGNIFICANT CHANGE UP (ref 40–120)
ALT FLD-CCNC: 14 U/L — SIGNIFICANT CHANGE UP (ref 10–45)
ANION GAP SERPL CALC-SCNC: 13 MMOL/L — SIGNIFICANT CHANGE UP (ref 5–17)
ANION GAP SERPL CALC-SCNC: 15 MMOL/L — SIGNIFICANT CHANGE UP (ref 5–17)
AST SERPL-CCNC: 19 U/L — SIGNIFICANT CHANGE UP (ref 10–40)
BILIRUB DIRECT SERPL-MCNC: <0.2 MG/DL — SIGNIFICANT CHANGE UP (ref 0–0.2)
BILIRUB INDIRECT FLD-MCNC: >0 MG/DL — LOW (ref 0.2–1)
BILIRUB SERPL-MCNC: 0.2 MG/DL — SIGNIFICANT CHANGE UP (ref 0.2–1.2)
BUN SERPL-MCNC: 29 MG/DL — HIGH (ref 7–23)
BUN SERPL-MCNC: 31 MG/DL — HIGH (ref 7–23)
CALCIUM SERPL-MCNC: 8.8 MG/DL — SIGNIFICANT CHANGE UP (ref 8.4–10.5)
CALCIUM SERPL-MCNC: 9.2 MG/DL — SIGNIFICANT CHANGE UP (ref 8.4–10.5)
CHLORIDE SERPL-SCNC: 92 MMOL/L — LOW (ref 96–108)
CHLORIDE SERPL-SCNC: 93 MMOL/L — LOW (ref 96–108)
CO2 SERPL-SCNC: 23 MMOL/L — SIGNIFICANT CHANGE UP (ref 22–31)
CO2 SERPL-SCNC: 24 MMOL/L — SIGNIFICANT CHANGE UP (ref 22–31)
CREAT ?TM UR-MCNC: 39 MG/DL — SIGNIFICANT CHANGE UP
CREAT SERPL-MCNC: 0.93 MG/DL — SIGNIFICANT CHANGE UP (ref 0.5–1.3)
CREAT SERPL-MCNC: 1.09 MG/DL — SIGNIFICANT CHANGE UP (ref 0.5–1.3)
GLUCOSE SERPL-MCNC: 124 MG/DL — HIGH (ref 70–99)
GLUCOSE SERPL-MCNC: 88 MG/DL — SIGNIFICANT CHANGE UP (ref 70–99)
HCT VFR BLD CALC: 33.1 % — LOW (ref 34.5–45)
HGB BLD-MCNC: 9.5 G/DL — LOW (ref 11.5–15.5)
INR BLD: 2.48 — HIGH (ref 0.88–1.16)
MAGNESIUM SERPL-MCNC: 2.2 MG/DL — SIGNIFICANT CHANGE UP (ref 1.6–2.6)
MCHC RBC-ENTMCNC: 20 PG — LOW (ref 27–34)
MCHC RBC-ENTMCNC: 28.7 G/DL — LOW (ref 32–36)
MCV RBC AUTO: 69.8 FL — LOW (ref 80–100)
OSMOLALITY SERPL: 283 MOSM/KG — SIGNIFICANT CHANGE UP (ref 280–301)
OSMOLALITY UR: 397 MOSMOL/KG — SIGNIFICANT CHANGE UP (ref 100–650)
PLATELET # BLD AUTO: 219 K/UL — SIGNIFICANT CHANGE UP (ref 150–400)
POTASSIUM SERPL-MCNC: 4.2 MMOL/L — SIGNIFICANT CHANGE UP (ref 3.5–5.3)
POTASSIUM SERPL-MCNC: 5.3 MMOL/L — SIGNIFICANT CHANGE UP (ref 3.5–5.3)
POTASSIUM SERPL-SCNC: 4.2 MMOL/L — SIGNIFICANT CHANGE UP (ref 3.5–5.3)
POTASSIUM SERPL-SCNC: 5.3 MMOL/L — SIGNIFICANT CHANGE UP (ref 3.5–5.3)
PROT SERPL-MCNC: 7.1 G/DL — SIGNIFICANT CHANGE UP (ref 6–8.3)
PROTHROM AB SERPL-ACNC: 28.1 SEC — HIGH (ref 9.8–12.7)
RBC # BLD: 4.74 M/UL — SIGNIFICANT CHANGE UP (ref 3.8–5.2)
RBC # FLD: 19.7 % — HIGH (ref 10.3–16.9)
SODIUM SERPL-SCNC: 130 MMOL/L — LOW (ref 135–145)
SODIUM SERPL-SCNC: 130 MMOL/L — LOW (ref 135–145)
SODIUM UR-SCNC: 37 MMOL/L — SIGNIFICANT CHANGE UP
UUN UR-MCNC: 431 MG/DL — SIGNIFICANT CHANGE UP
WBC # BLD: 7.6 K/UL — SIGNIFICANT CHANGE UP (ref 3.8–10.5)
WBC # FLD AUTO: 7.6 K/UL — SIGNIFICANT CHANGE UP (ref 3.8–10.5)

## 2017-11-20 PROCEDURE — 93970 EXTREMITY STUDY: CPT | Mod: 26

## 2017-11-20 PROCEDURE — 93010 ELECTROCARDIOGRAM REPORT: CPT

## 2017-11-20 RX ORDER — WARFARIN SODIUM 2.5 MG/1
6 TABLET ORAL ONCE
Qty: 0 | Refills: 0 | Status: DISCONTINUED | OUTPATIENT
Start: 2017-11-20 | End: 2017-11-20

## 2017-11-20 RX ORDER — LIDOCAINE 4 G/100G
1 CREAM TOPICAL DAILY
Qty: 0 | Refills: 0 | Status: DISCONTINUED | OUTPATIENT
Start: 2017-11-20 | End: 2017-11-22

## 2017-11-20 RX ORDER — LIDOCAINE 4 G/100G
0 CREAM TOPICAL
Qty: 0 | Refills: 0 | COMMUNITY
Start: 2017-11-20

## 2017-11-20 RX ORDER — WARFARIN SODIUM 2.5 MG/1
6 TABLET ORAL ONCE
Qty: 0 | Refills: 0 | Status: COMPLETED | OUTPATIENT
Start: 2017-11-20 | End: 2017-11-20

## 2017-11-20 RX ADMIN — Medication 1 TABLET(S): at 00:09

## 2017-11-20 RX ADMIN — Medication 2 TABLET(S): at 00:24

## 2017-11-20 RX ADMIN — ONDANSETRON 8 MILLIGRAM(S): 8 TABLET, FILM COATED ORAL at 04:59

## 2017-11-20 RX ADMIN — BACITRACIN AND POLYMYXIN B SULFATE 1 APPLICATION(S): 500; 10000 OINTMENT TOPICAL at 06:35

## 2017-11-20 RX ADMIN — Medication 2 TABLET(S): at 19:34

## 2017-11-20 RX ADMIN — BACITRACIN AND POLYMYXIN B SULFATE 1 APPLICATION(S): 500; 10000 OINTMENT TOPICAL at 22:50

## 2017-11-20 RX ADMIN — Medication 1 APPLICATION(S): at 00:03

## 2017-11-20 RX ADMIN — Medication 2 TABLET(S): at 18:34

## 2017-11-20 RX ADMIN — Medication 2.5 MILLIGRAM(S): at 06:36

## 2017-11-20 RX ADMIN — Medication 1 MILLIGRAM(S): at 18:34

## 2017-11-20 RX ADMIN — PANTOPRAZOLE SODIUM 40 MILLIGRAM(S): 20 TABLET, DELAYED RELEASE ORAL at 06:37

## 2017-11-20 RX ADMIN — Medication 2 TABLET(S): at 10:55

## 2017-11-20 RX ADMIN — Medication 2 TABLET(S): at 04:58

## 2017-11-20 RX ADMIN — AMIODARONE HYDROCHLORIDE 100 MILLIGRAM(S): 400 TABLET ORAL at 13:43

## 2017-11-20 RX ADMIN — SPIRONOLACTONE 25 MILLIGRAM(S): 25 TABLET, FILM COATED ORAL at 06:36

## 2017-11-20 RX ADMIN — PANTOPRAZOLE SODIUM 40 MILLIGRAM(S): 20 TABLET, DELAYED RELEASE ORAL at 16:10

## 2017-11-20 RX ADMIN — Medication 25 MILLIGRAM(S): at 06:36

## 2017-11-20 RX ADMIN — Medication 2 TABLET(S): at 11:43

## 2017-11-20 RX ADMIN — Medication 2 TABLET(S): at 06:06

## 2017-11-20 RX ADMIN — Medication 81 MILLIGRAM(S): at 06:36

## 2017-11-20 RX ADMIN — LIDOCAINE 1 PATCH: 4 CREAM TOPICAL at 12:09

## 2017-11-20 NOTE — PROGRESS NOTE ADULT - PROBLEM SELECTOR PLAN 7
Was prescribed Ethambutol and Azithromycin of unknown duration by Dr. Faust, however hasn't been taken medications 2/2 GI side effects.  - per pulm note: "Started treatment for cavitary VLADIMIR this september with Azithromycin and Ethambutol which she hasn't been able to tolerate.  It seems that the only curable option for her VLADIMIR would be surgery, triple therapy and an aminoglycoside, none of which she would be able to tolerate.  Given the fact she has severe underlying heart disease it is more likely that she will succumb to her heart disease prior to VLADIMIR."  - f/u w/ Dr Faust and Dr. Alvarenga as outpatient after discharge Pt w/ severe anxiety. On Lorazepam 1mg PO at home.  - continue home lorazepam 1mg PO daily PRN  -consult psychiatry for recommendations regarding current dosing/frequency, appreciate recommendations

## 2017-11-20 NOTE — ED BEHAVIORAL HEALTH NOTE - BEHAVIORAL HEALTH NOTE
74 year old female with multiple medical problems (including CAD s/p PCI, AICD/PPM, CHF, hiatal hernia, HTN, GERD,  ventricular tachycardia, spinal stenosis s/p herniated disc repair, and arthritis) who was initially admitted with 1-day hx of LUE swelling, later transferred to cardiology floor for chest pain workup. Psychiatry was consulted today as patient complained of severe anxiety.     Writer and Attending on Call evaluated the patient. She was quite difficult to engage in a meaningful discussion about her anxiety on account of being circumstantial and tangential. She remained fixated on her medical concerns and kept returning to the subject, emphasizing how she believes that her anxiety is a medical concern and therefore does not require a psychiatric evaluation or intervention. She came across as somewhat grandiose and kept emphasizing how she has a law degree, went to [place] and is somehow “very qualified” to make the determination that she does not require psychiatric care. Psych consult Attending/Writer attempted to approach her concerns of anxiety from various angles with the hope of developing a rapport and opening her up to further suggestions. Although she out rightly denied depressive symptoms, she came across as mildly depressed/dysphoric. Antidepressants were suggested, at which point she became quite upset and emphasized that she has never seen a psychiatrist in the past and has never warranted inpatient/outpatient psychiatric care.     She also came across as somewhat unreasonable at times. For instance, when writer/Attending attempted to discuss the fact that she kept requesting for a nasal cannula in spite of not needing it as per the medical team – patient launched into an elaborate, somewhat implausible explanation as to why she felt she needed it. She remained fixated on her cardiac condition and elaborated at length about the nuances of treatment and kept sidelining the issue of her anxiety and possible underlying depression/paranoia. She was difficult to redirect throughout the interaction and at one point requested that we leave her alone by ending the evaluation.    She denied SI/HI/AH/VH; no clear cut delusions verbalized during the evaluation; in good behavioral control.    Past psychiatric History :  Denied history of prior psychiatric treatment or hospitalization    Social History:   Reportedly used to live in an assisted living facility. Cardiology plans to set her up with subacute rehabilitation following discharge.    Mental State Exam :  Appearance/attitude: thin, elderly woman; ill at ease; fair eye contact; difficult to engage in a meaningful discussion; superficially cooperative  Motor activity : no psychomotor retardation or agitation noted  Speech : normal  Mood :  anxious, irritable, dysphoric/depressed  Affect : Congruent  Thought process : tangential; circumstantial  Thought content : no clear cut delusions; no SI/HI  Insight : Limited  Judgement : Questionable  Cognition : possible mild cognitive impairment; requires further testing  Orientation : Oriented to T,P,P  Concentration : normal    Assessment :  74 year old female with multiple medical problems (including CAD s/p PCI, AICD/PPM, CHF, hiatal hernia, HTN, GERD,  ventricular tachycardia, spinal stenosis s/p herniated disc repair, and arthritis) who was initially admitted with 1-day hx of LUE swelling, later transferred to cardiology floor for chest pain workup. Psychiatry was consulted today as patient complained of severe anxiety. Patient was difficult to evaluate on account of her tangentiality/circumstantiality; her demanding personality with narcissistic and possible paranoid traits. She remains fixated on her medical condition to a degree that suggests a possible hypochondriacal component to her presentation and is unable to see the bigger picture or the context within which her current symptoms are unfolding. 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 There is also the possibility that she has a mild degree of cognitive impairment (requires further testing). 74 year old female with multiple medical problems (including CAD s/p PCI, AICD/PPM, CHF, hiatal hernia, HTN, GERD,  ventricular tachycardia, spinal stenosis s/p herniated disc repair, and arthritis) who was initially admitted with 1-day hx of LUE swelling, later transferred to cardiology floor for chest pain workup. Psychiatry was consulted today as patient complained of severe anxiety.     Writer and Attending on Call evaluated the patient. She was quite difficult to engage in a meaningful discussion about her anxiety on account of being circumstantial and tangential. She remained fixated on her medical concerns and kept returning to the subject, emphasizing how she believes that her anxiety is a medical concern and therefore does not require a psychiatric evaluation or intervention. She came across as somewhat grandiose and kept emphasizing how she has a law degree, went to [place] and is somehow “very qualified” to make the determination that she does not require psychiatric care. Psych consult Attending/Writer attempted to approach her concerns of anxiety from various angles with the hope of developing a rapport and opening her up to further suggestions. Although she out rightly denied depressive symptoms, she came across as mildly depressed/dysphoric. Antidepressants were suggested, at which point she became quite upset and emphasized that she has never seen a psychiatrist in the past and has never warranted inpatient/outpatient psychiatric care.     She also came across as somewhat unreasonable at times. For instance, when writer/Attending attempted to discuss the fact that she kept requesting for a nasal cannula in spite of not needing it as per the medical team – patient launched into an elaborate, somewhat implausible explanation as to why she felt she needed it. She remained fixated on her cardiac condition and elaborated at length about the nuances of treatment and kept sidelining the issue of her anxiety and possible underlying depression/paranoia. She was difficult to redirect throughout the interaction and at one point requested that we leave her alone by ending the evaluation.    She denied SI/HI/AH/VH; no clear cut delusions verbalized during the evaluation; in good behavioral control.    Past psychiatric History :  Denied history of prior psychiatric treatment or hospitalization    Social History:   Reportedly used to live in an assisted living facility. Cardiology plans to set her up with subacute rehabilitation following discharge.    Mental State Exam :  Appearance/attitude: thin, elderly woman; ill at ease; fair eye contact; difficult to engage in a meaningful discussion; superficially cooperative  Motor activity : no psychomotor retardation or agitation noted  Speech : normal  Mood :  anxious, irritable, dysphoric/depressed  Affect : Congruent  Thought process : tangential; circumstantial  Thought content : no clear cut delusions; no SI/HI  Insight : Limited  Judgement : Questionable  Cognition : possible mild cognitive impairment; requires further testing  Orientation : Oriented to T,P,P  Concentration : normal    Assessment :  74 year old female with multiple medical problems (including CAD s/p PCI, AICD/PPM, CHF, hiatal hernia, HTN, GERD,  ventricular tachycardia, spinal stenosis s/p herniated disc repair, and arthritis) who was initially admitted with 1-day hx of LUE swelling, later transferred to cardiology floor for chest pain workup. Psychiatry was consulted today as patient complained of severe anxiety. Patient was difficult to evaluate on account of her tangentiality/circumstantiality; her demanding personality with narcissistic and possible paranoid traits. She remains fixated on her medical condition to a degree that suggests a possible hypochondriacal component to her presentation and is unable to see the bigger picture or the context within which her current symptoms are unfolding. 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 There is also the possibility that she has a mild degree of cognitive impairment (requires further testing).    Plan:  -recommend initiation of Risperidone 0.5mg daily for agitation/disorganization, suspected moderate cognitive impairment complicated by paranoid/narcissistic personality traits  -Psych CL team to followup in AM 74 year old female with multiple medical problems (including CAD s/p PCI, AICD/PPM, CHF, hiatal hernia, HTN, GERD,  ventricular tachycardia, spinal stenosis s/p herniated disc repair, and arthritis) who was initially admitted with 1-day hx of LUE swelling, later transferred to cardiology floor for chest pain workup. Psychiatry was consulted today as patient complained of severe anxiety.     Writer and Attending on Call evaluated the patient. She was quite difficult to engage in a meaningful discussion about her anxiety on account of being circumstantial and tangential. She remained fixated on her medical concerns and kept returning to the subject, emphasizing how she believes that her anxiety is a medical concern and therefore does not require a psychiatric evaluation or intervention. She came across as somewhat grandiose and kept emphasizing how she has a law degree, went to [place] and is somehow “very qualified” to make the determination that she does not require psychiatric care. Psych consult Attending/Writer attempted to approach her concerns of anxiety from various angles with the hope of developing a rapport and opening her up to further suggestions. Although she out rightly denied depressive symptoms, she came across as mildly depressed/dysphoric. Antidepressants were suggested, at which point she became quite upset and emphasized that she has never seen a psychiatrist in the past and has never warranted inpatient/outpatient psychiatric care.     She also came across as somewhat unreasonable at times. For instance, when writer/Attending attempted to discuss the fact that she kept requesting for a nasal cannula in spite of not needing it as per the medical team – patient launched into an elaborate, somewhat implausible explanation as to why she felt she needed it. She remained fixated on her cardiac condition and elaborated at length about the nuances of treatment and kept sidelining the issue of her anxiety and possible underlying depression/paranoia. She was difficult to redirect throughout the interaction and at one point requested that we leave her alone by ending the evaluation.    She denied SI/HI/AH/VH; no clear cut delusions verbalized during the evaluation; in good behavioral control.    Past psychiatric History :  Denied history of prior psychiatric treatment or hospitalization    Social History:   Reportedly used to live in an assisted living facility. Cardiology plans to set her up with subacute rehabilitation following discharge.    Mental State Exam :  Appearance/attitude: thin, elderly woman; ill at ease; fair eye contact; difficult to engage in a meaningful discussion; superficially cooperative  Motor activity : no psychomotor retardation or agitation noted  Speech : normal  Mood :  anxious, irritable, dysphoric/depressed  Affect : Congruent  Thought process : tangential; circumstantial  Thought content : no clear cut delusions; no SI/HI  Insight : Limited  Judgement : Questionable  Cognition : possible mild cognitive impairment; requires further testing  Orientation : Oriented to T,P,P  Concentration : normal .    Assessment :  74 year old female with multiple medical problems (including CAD s/p PCI, AICD/PPM, CHF, hiatal hernia, HTN, GERD,  ventricular tachycardia, spinal stenosis s/p herniated disc repair, and arthritis) who was initially admitted with 1-day hx of LUE swelling, later transferred to cardiology floor for chest pain workup. Psychiatry was consulted today as patient complained of severe anxiety. Patient was difficult to evaluate on account of her tangentiality/circumstantiality; her demanding personality with narcissistic and possible paranoid traits. She remains fixated on her medical condition to a degree that suggests a possible hypochondriacal component to her presentation and is unable to see the bigger picture or the context within which her current symptoms are unfolding. 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 There is also the possibility that she has a mild degree of cognitive impairment (requires further testing).    Plan:  -recommend initiation of Risperidone 0.5mg daily for agitation/disorganization, suspected moderate cognitive impairment complicated by paranoid/narcissistic personality traits  -Psych CL team to followup in AM

## 2017-11-20 NOTE — PROGRESS NOTE ADULT - PROBLEM SELECTOR PLAN 5
BPs currently stable/normotensive  - home enalapril as above  - home toprol XL as above hyponatremia slightly worsening in setting of active diuresis. Patient clinically dry-appearing. Patient persistently net negative.    - improved from 128 to 130, steady at 130. Holding lasix.    - will check serum osm, urine lytes - f/u, not yet collected.

## 2017-11-20 NOTE — PROGRESS NOTE ADULT - ASSESSMENT
This is a 71yo F w/ extensive PMH significant for anxiety, CAD s/p PCI, HFrEF (EF20-25% w/ AICD/PPM), h/o VT, HTN admitted for LUE erythema w/ subjective swelling since resolved; suspected etiology 2/2 dermatitis c/b development of chest pain now being transferred to cardiac telemetry, with negative workup for ACS to date, pending cardiac stress testing.

## 2017-11-20 NOTE — PROGRESS NOTE ADULT - PROBLEM SELECTOR PLAN 8
F: no IVF  E: replete lytes PRN to maintain K>4, Mg>2  N: NPOpMN for possible stress test in AM, then back to DASH-TLC diet

## 2017-11-20 NOTE — PROGRESS NOTE ADULT - PROBLEM SELECTOR PLAN 4
hyponatremia slightly worsening in setting of active diuresis. Patient clinically dry-appearing. Patient persistently net negative.    - Discussed with cardiologist, will hold AM lasix dose tomorrow and continue to trend.   - will check serum osm, urine lytes s/p AICD/PPM  - home amiodarone 100mg PO daily  - EP interrogated AICD: "Normal dual chamber ICD functioning. AFib burden < 1%.  Few episodes of atrial arrhythmia lasted for 7 to 20 seconds.  No AFIB episodes noted since the last diagnostic reset on August 2017.  Episodes of "non-sustained ventricular events" are really just the abovementioned atrial tach with 1:1 conduction. No VT or ICD shock recently (ie from the last reset in august). "

## 2017-11-20 NOTE — PROGRESS NOTE ADULT - SUBJECTIVE AND OBJECTIVE BOX
Interval Events:  Patient seen and examined at bedside.    Patient is a 72y old  Female who presents with a chief complaint of LUE swelling and redness (15 Nov 2017 11:58)      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  Status post percutaneous transluminal coronary angioplasty: Stented coronary artery  Atherosclerosis of coronary artery: CAD (coronary atherosclerotic disease)  Gastroesophageal reflux disease: GERD (gastroesophageal reflux disease)  Migraine: Migraine  Essential hypertension: Hypertension  Automatic implantable cardiac defibrillator in situ: AICD (automatic cardioverter/defibrillator) present  Old myocardial infarction: MI, old  Other postprocedural status: S/P appendectomy  Automatic implantable cardiac defibrillator in situ: Automatic implantable cardioverter-defibrillator in situ  Status post percutaneous transluminal coronary angioplasty: Stented coronary artery      MEDICATIONS:  Pulmonary:    Antimicrobials:    Anticoagulants:  aspirin enteric coated 81 milliGRAM(s) Oral daily  warfarin 6 milliGRAM(s) Oral once    Cardiac:  amiodarone    Tablet 100 milliGRAM(s) Oral daily  enalapril 2.5 milliGRAM(s) Oral daily  metoprolol succinate ER 25 milliGRAM(s) Oral daily  spironolactone 25 milliGRAM(s) Oral daily      Allergies    codeine (Unknown)  Demerol HCl (Unknown)  nitroglycerin (Unknown)  Percocet 10/325 (Unknown)  sulfa drugs (Unknown)    Intolerances        Vital Signs Last 24 Hrs  T(C): 36.7 (20 Nov 2017 13:08), Max: 36.7 (20 Nov 2017 13:08)  T(F): 98 (20 Nov 2017 13:08), Max: 98 (20 Nov 2017 13:08)  HR: 66 (20 Nov 2017 17:01) (60 - 74)  BP: 117/49 (20 Nov 2017 17:01) (96/54 - 126/63)  BP(mean): 88 (20 Nov 2017 05:40) (67 - 88)  RR: 15 (20 Nov 2017 17:01) (15 - 17)  SpO2: 97% (20 Nov 2017 17:01) (95% - 100%)    11-19 @ 07:01  -  11-20 @ 07:00  --------------------------------------------------------  IN: 450 mL / OUT: 2700 mL / NET: -2250 mL    11-20 @ 07:01  -  11-20 @ 18:32  --------------------------------------------------------  IN: 120 mL / OUT: 150 mL / NET: -30 mL      Lungs- clear b/l;  CV-  RRR S1S2    LABS:      CBC Full  -  ( 20 Nov 2017 06:17 )  WBC Count : 7.6 K/uL  Hemoglobin : 9.5 g/dL  Hematocrit : 33.1 %  Platelet Count - Automated : 219 K/uL  Mean Cell Volume : 69.8 fL  Mean Cell Hemoglobin : 20.0 pg  Mean Cell Hemoglobin Concentration : 28.7 g/dL  Auto Neutrophil # : x  Auto Lymphocyte # : x  Auto Monocyte # : x  Auto Eosinophil # : x  Auto Basophil # : x  Auto Neutrophil % : x  Auto Lymphocyte % : x  Auto Monocyte % : x  Auto Eosinophil % : x  Auto Basophil % : x    11-20    130<L>  |  92<L>  |  29<H>  ----------------------------<  124<H>  4.2   |  23  |  0.93    Ca    8.8      20 Nov 2017 12:58  Mg     2.2     11-20    TPro  7.1  /  Alb  4.2  /  TBili  0.2  /  DBili  <0.2  /  AST  19  /  ALT  14  /  AlkPhos  78  11-20    PT/INR - ( 20 Nov 2017 06:17 )   PT: 28.1 sec;   INR: 2.48          PTT - ( 19 Nov 2017 06:51 )  PTT:38.2 sec                            Assessment and Plan:  Today's events reviewed-  and I discussed the patient several times today with the housestaff also with case mgmt.  The pt should go to  subacute rehab and I told her this tonight-  she is agreeing to let PT evaluate her tomorrow.  Agree with psych to help with anxiety.

## 2017-11-20 NOTE — PROGRESS NOTE ADULT - PROBLEM SELECTOR PLAN 1
EF20-25% s/p AICD/PPM particularly w/ hx paroxysmal VT; not appearing in acute exacerbation  - home enalapril 2.5mg PO daily  - home toprol XL 25mg PO daily  -echo to evaluate for cardiac changes:   -currently on supplemental 02 at 1L, unclear if necessary, on per patient preference, will wean patient EF20-25% s/p AICD/PPM particularly w/ hx paroxysmal VT; not appearing in acute exacerbation  - home enalapril 2.5mg PO daily  - home toprol XL 25mg PO daily  -currently on supplemental 02 at 1L, unclear if necessary, on per patient preference, will wean patient  -echo 11/17 shows no change from prior EF20-25% s/p AICD/PPM particularly w/ hx paroxysmal VT; not appearing in acute exacerbation  - home enalapril 2.5mg PO daily  - home toprol XL 25mg PO daily  -currently on supplemental 02 at 1L, unclear if necessary, on per patient preference, will wean patient  -echo 11/17 shows no change from prior  -lower extremity dopplers negative for DVT, swelling in legs resolved

## 2017-11-20 NOTE — PROGRESS NOTE ADULT - PROBLEM SELECTOR PLAN 9
.  DVT PPx: coumadin    CODE: FULL    DISPO: continue monitoring on [facility]lach[facility]; likely DC to [facility] pending stress and PT eval .  DVT PPx: coumadin    #disposition  -will consult ethics if difficulty discharging patient  -PT recommendations: patient refused PT eval x3, understands that she cannot be discharged with home oxygen without an evaluation from PT    CODE: FULL    DISPO: continue monitoring on 5lach[facility]; likely DC to [facility] pending stress and PT eval .  DVT PPx: coumadin    #disposition  -will consult ethics if difficulty discharging patient  -PT recommendations: patient refused PT eval x3, understands that she cannot be discharged with home oxygen without an evaluation from PT  -KEL per case management    CODE: FULL    DISPO: continue monitoring on 5lachman; likely DC to [facility] pending stress and PT eval

## 2017-11-20 NOTE — CHART NOTE - NSCHARTNOTEFT_GEN_A_CORE
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  Status post percutaneous transluminal coronary angioplasty: Stented coronary artery  Atherosclerosis of coronary artery: CAD (coronary atherosclerotic disease)  Gastroesophageal reflux disease: GERD (gastroesophageal reflux disease)  Migraine: Migraine  Essential hypertension: Hypertension  Automatic implantable cardiac defibrillator in situ: AICD (automatic cardioverter/defibrillator) present  Old myocardial infarction: MI, old  Other postprocedural status: S/P appendectomy  Automatic implantable cardiac defibrillator in situ: Automatic implantable cardioverter-defibrillator in situ  Status post percutaneous transluminal coronary angioplasty: Stented coronary artery      Current Nutrition Order: DASH/TLC sodium and cholesterol restricted (11/15)       PO Intake: Good (%) [   ]  Fair (50-75%) [ x  ] Poor (<25%) [   ]    GI Issues: C/o nausea. Reports last episode of vomiting PTA. GI: WDL per flowsheet (11/20).    Pain: C/o persistent chest pain; reported pain scale 10/10.    Skin Integrity: Intact; grady score 16    Labs:   11-20    130<L>  |  92<L>  |  29<H>  ----------------------------<  124<H>  4.2   |  23  |  0.93    Ca    8.8      20 Nov 2017 12:58  Mg     2.2     11-20      CAPILLARY BLOOD GLUCOSE          Medications:  MEDICATIONS  (STANDING):  amiodarone    Tablet 100 milliGRAM(s) Oral daily  aspirin enteric coated 81 milliGRAM(s) Oral daily  BACItracin/polymyxin B Ointment 1 Application(s) Topical two times a day  enalapril 2.5 milliGRAM(s) Oral daily  ferrous    sulfate 325 milliGRAM(s) Oral daily  lidocaine   Patch 1 Patch Transdermal daily  metoprolol succinate ER 25 milliGRAM(s) Oral daily  pantoprazole    Tablet 40 milliGRAM(s) Oral two times a day before meals  spironolactone 25 milliGRAM(s) Oral daily    MEDICATIONS  (PRN):  acetaminophen 325 mG/butalbital 50 mG/caffeine 40 mG 2 Tablet(s) Oral every 4 hours PRN headache  AQUAPHOR (petrolatum Ointment) 1 Application(s) Topical three times a day PRN Pruritis / Dry skin  LORazepam     Tablet 1 milliGRAM(s) Oral daily PRN Anxiety  ondansetron    Tablet 8 milliGRAM(s) Oral two times a day PRN Nausea and/or Vomiting      Weight:  Daily: 39.5kg (11/14)     Daily: 43.6kg (11/19 - bed scale weight)    Weight Change: no weight changes since admission.    Estimated energy needs:   Ht. 64". BW 43.6kg (bed scale weight - 11/19). BMI 16.6. IBW 120lbs+/- 10%. 80%IBW +/-10%.   Used IBW to calculate patient needs per patient BMI <18.5.   Increased nutrient needs 2/2 moderate protein calorie malnutrition and infection  Kcal (30-35kcal/kg IBW) = 1632-1904kcal/d  Pro (1.2-1.4g/kg IBW) = 65-76g/d  Fluids (30-35cc/kg IBW) = 1632-1904cc/d    Subjective: 71yo female admitted with left upper extremity cellulitis (11/14). Current diet order DASH/TLC (11/15). Reports poor appetite; PO intake ~50% of meals. Patient c/o persistent nausea, shortness of breath and chest pain attributes to reduced PO intake; pain scale 10/10. Recalls last episode of vomiting PTA. C/o slight difficulty swallowing. Patient demonstrates strong knowledge of heart healthy, low sodium diet; asking for softer foods to help with swallowing. RD offered mechanical soft diet yet patient rejected. RD encouraged consistent PO intake >75% of meals to support healing, recovery, energy levels and to defer further weight loss. Patient expressed positive verbal understanding and indicated motivation for compliance.        Previous Nutrition Diagnosis:    Active [   ]  Resolved [   ]    If resolved, new PES:     Goal:    Recommendations:    Education:     Risk Level: High [   ] Moderate [   ] Low [   ] 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  Status post percutaneous transluminal coronary angioplasty: Stented coronary artery  Atherosclerosis of coronary artery: CAD (coronary atherosclerotic disease)  Gastroesophageal reflux disease: GERD (gastroesophageal reflux disease)  Migraine: Migraine  Essential hypertension: Hypertension  Automatic implantable cardiac defibrillator in situ: AICD (automatic cardioverter/defibrillator) present  Old myocardial infarction: MI, old  Other postprocedural status: S/P appendectomy  Automatic implantable cardiac defibrillator in situ: Automatic implantable cardioverter-defibrillator in situ  Status post percutaneous transluminal coronary angioplasty: Stented coronary artery      Current Nutrition Order: DASH/TLC sodium and cholesterol restricted (11/15)       PO Intake: Good (%) [   ]  Fair (50-75%) [  ] Poor (<25%) [   ] -- reported PO intake ~40% EER    GI Issues: C/o persistent nausea. Reports last episode of vomiting PTA. GI: WDL per flowsheet (11/20).    Pain: C/o persistent chest pain; reported pain scale 10/10.    Skin Integrity: Intact; grady score 16 per flowsheet (11/20)    Labs:   11-20    130<L>  |  92<L>  |  29<H>  ----------------------------<  124<H>  4.2   |  23  |  0.93    Ca    8.8      20 Nov 2017 12:58  Mg     2.2     11-20      CAPILLARY BLOOD GLUCOSE          Medications:  MEDICATIONS  (STANDING):  amiodarone    Tablet 100 milliGRAM(s) Oral daily  aspirin enteric coated 81 milliGRAM(s) Oral daily  BACItracin/polymyxin B Ointment 1 Application(s) Topical two times a day  enalapril 2.5 milliGRAM(s) Oral daily  ferrous    sulfate 325 milliGRAM(s) Oral daily  lidocaine   Patch 1 Patch Transdermal daily  metoprolol succinate ER 25 milliGRAM(s) Oral daily  pantoprazole    Tablet 40 milliGRAM(s) Oral two times a day before meals  spironolactone 25 milliGRAM(s) Oral daily    MEDICATIONS  (PRN):  acetaminophen 325 mG/butalbital 50 mG/caffeine 40 mG 2 Tablet(s) Oral every 4 hours PRN headache  AQUAPHOR (petrolatum Ointment) 1 Application(s) Topical three times a day PRN Pruritis / Dry skin  LORazepam     Tablet 1 milliGRAM(s) Oral daily PRN Anxiety  ondansetron    Tablet 8 milliGRAM(s) Oral two times a day PRN Nausea and/or Vomiting      Weight:  Daily: 39.5kg (11/14)     Daily: 43.6kg (11/19 - bed scale weight)    Weight Change: no weight changes since admission.    Estimated energy needs:   Ht. 64". BW 43.6kg (bed scale weight - 11/19). BMI 16.6. IBW 120lbs+/- 10%. 80%IBW +/-10%.   Used IBW to calculate patient needs per patient BMI <18.5.   Increased nutrient needs 2/2 moderate protein calorie malnutrition and infection  Kcal (30-35kcal/kg IBW) = 1632-1904kcal/d  Pro (1.2-1.4g/kg IBW) = 65-76g/d  Fluids (30-35cc/kg IBW) = 1632-1904cc/d    Subjective: 73yo female admitted with left upper extremity cellulitis (11/14). Current diet order DASH/TLC (11/15). Reports poor appetite; PO intake ~50% of meals. C/o persistent nausea, slight difficulty swallowing 2/2 shortness of breath, and chest pain patient attributes to reduced PO intake; pain scale 10/10. Recalls last episode of vomiting PTA. Patient demonstrates strong knowledge of heart healthy, low sodium diet; asking for softer foods to help with swallowing. RD offered mechanical soft diet and ONS Ensure Enlives to support increased PO intake yet patient rejected. RD encouraged consistent PO intake >75% of meals to support healing, recovery, energy levels and to defer further weight loss. Patient expressed positive verbal understanding and indicated motivation for compliance.        Previous Nutrition Diagnosis:   Inadequate energy intake RT decreased PO intake 2/2 nausea and vomiting AEB <50% energy intake for >5 days    Active [ x  ]  Resolved [   ]    If resolved, new PES:     Goal: Patient will have 50% or greater PO intake per meal with +tolerance, working towards achievement of consistent PO intake >75% EER as tolerated.    Recommendations:  1. Continue to encourage PO intake >60% of meals with +tolerance, working towards >75% of meals as tolerated   2, Continue to monitor for GI distress - patient c/o persistent nausea reducing PO intake  3. Continue to monitor pain and medically manage per patients RN prn - patient c/o persistent chest pain reducing PO intake; pain scale 10/10  4. Obtain weights weekly and track trends per flowsheet.  5. Monitor lytes and replete prn - Na 130, Cl 93 (11/20)    Education: RD encouraged consistent PO intake >75% of meals to support healing, recovery, energy levels and to defer further weight loss. Patient expressed positive verbal understanding and indicated motivation for compliance.        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  Status post percutaneous transluminal coronary angioplasty: Stented coronary artery  Atherosclerosis of coronary artery: CAD (coronary atherosclerotic disease)  Gastroesophageal reflux disease: GERD (gastroesophageal reflux disease)  Migraine: Migraine  Essential hypertension: Hypertension  Automatic implantable cardiac defibrillator in situ: AICD (automatic cardioverter/defibrillator) present  Old myocardial infarction: MI, old  Other postprocedural status: S/P appendectomy  Automatic implantable cardiac defibrillator in situ: Automatic implantable cardioverter-defibrillator in situ  Status post percutaneous transluminal coronary angioplasty: Stented coronary artery      Current Nutrition Order: DASH/TLC sodium and cholesterol restricted (11/15)       PO Intake: Good (%) [   ]  Fair (50-75%) [  ] Poor (<25%) [   ] -- reported PO intake ~40% EER    GI Issues: C/o persistent nausea. Reports last episode of vomiting PTA. GI: WDL per flowsheet (11/20).    Pain: C/o persistent chest pain; reported pain scale 10/10.    Skin Integrity: Intact; grady score 16 per flowsheet (11/20)    Labs:   11-20    130<L>  |  92<L>  |  29<H>  ----------------------------<  124<H>  4.2   |  23  |  0.93    Ca    8.8      20 Nov 2017 12:58  Mg     2.2     11-20      CAPILLARY BLOOD GLUCOSE          Medications:  MEDICATIONS  (STANDING):  amiodarone    Tablet 100 milliGRAM(s) Oral daily  aspirin enteric coated 81 milliGRAM(s) Oral daily  BACItracin/polymyxin B Ointment 1 Application(s) Topical two times a day  enalapril 2.5 milliGRAM(s) Oral daily  ferrous    sulfate 325 milliGRAM(s) Oral daily  lidocaine   Patch 1 Patch Transdermal daily  metoprolol succinate ER 25 milliGRAM(s) Oral daily  pantoprazole    Tablet 40 milliGRAM(s) Oral two times a day before meals  spironolactone 25 milliGRAM(s) Oral daily    MEDICATIONS  (PRN):  acetaminophen 325 mG/butalbital 50 mG/caffeine 40 mG 2 Tablet(s) Oral every 4 hours PRN headache  AQUAPHOR (petrolatum Ointment) 1 Application(s) Topical three times a day PRN Pruritis / Dry skin  LORazepam     Tablet 1 milliGRAM(s) Oral daily PRN Anxiety  ondansetron    Tablet 8 milliGRAM(s) Oral two times a day PRN Nausea and/or Vomiting      Weight:  Daily: 39.5kg (11/14)     Daily: 43.6kg (11/19 - bed scale weight)    Weight Change: no weight changes since admission.    Estimated energy needs:   Ht. 64". BW 43.6kg (bed scale weight - 11/19). BMI 16.6. IBW 120lbs+/- 10%. 80%IBW +/-10%.   Used IBW to calculate patient needs per patient BMI <18.5.   Increased nutrient needs 2/2 moderate protein calorie malnutrition and infection  Kcal (30-35kcal/kg IBW) = 1632-1904kcal/d  Pro (1.2-1.4g/kg IBW) = 65-76g/d  Fluids (30-35cc/kg IBW) = 1632-1904cc/d    Subjective: 73yo female admitted with left upper extremity cellulitis (11/14). Current diet order DASH/TLC (11/15). Reports poor appetite; PO intake ~50% of meals. C/o persistent nausea, slight difficulty swallowing 2/2 shortness of breath, and chest pain patient attributes to reduced PO intake; pain scale 10/10. Recalls last episode of vomiting PTA. Patient demonstrates strong knowledge of heart healthy, low sodium diet; asking for softer foods to help with swallowing. RD offered mechanical soft diet and ONS Ensure Enlives to support increased PO intake yet patient rejected. RD encouraged consistent PO intake >75% of meals to support healing, recovery, energy levels and to defer further weight loss. Patient expressed positive verbal understanding and indicated motivation for compliance.        Previous Nutrition Diagnosis:   Inadequate energy intake RT decreased PO intake 2/2 nausea and vomiting AEB <50% energy intake for >5 days    Active [ x  ]  Resolved [   ]    If resolved, new PES:     Goal: Patient will have 50% or greater PO intake per meal with +tolerance, working towards achievement of consistent PO intake >75% EER as tolerated.    Recommendations:  1. Continue to encourage PO intake >50% of meals with +tolerance, working towards >75% of meals as tolerated   2, Continue to monitor for GI distress - patient c/o persistent nausea reducing PO intake  3. Continue to monitor pain and medically manage per patients RN prn - patient c/o persistent chest pain reducing PO intake; pain scale 10/10  4. Obtain weights weekly and track trends per flowsheet.  5. Monitor lytes and replete prn - Na 130, Cl 93 (11/20)    Education: RD encouraged consistent PO intake >75% of meals to support healing, recovery, energy levels and to defer further weight loss. Patient expressed positive verbal understanding and indicated motivation for compliance.        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  Status post percutaneous transluminal coronary angioplasty: Stented coronary artery  Atherosclerosis of coronary artery: CAD (coronary atherosclerotic disease)  Gastroesophageal reflux disease: GERD (gastroesophageal reflux disease)  Migraine: Migraine  Essential hypertension: Hypertension  Automatic implantable cardiac defibrillator in situ: AICD (automatic cardioverter/defibrillator) present  Old myocardial infarction: MI, old  Other postprocedural status: S/P appendectomy  Automatic implantable cardiac defibrillator in situ: Automatic implantable cardioverter-defibrillator in situ  Status post percutaneous transluminal coronary angioplasty: Stented coronary artery      Current Nutrition Order: DASH/TLC sodium and cholesterol restricted (11/15)       PO Intake: Good (%) [   ]  Fair (50-75%) [  ] Poor (<25%) [   ] -- reported PO intake ~40% EER    GI Issues: C/o persistent nausea. Reports last episode of vomiting PTA. GI: WDL per flowsheet (11/20).    Pain: C/o persistent chest pain; reported pain scale 10/10.    Skin Integrity: Intact; grady score 16 per flowsheet (11/20)    Labs:   11-20    130<L>  |  92<L>  |  29<H>  ----------------------------<  124<H>  4.2   |  23  |  0.93    Ca    8.8      20 Nov 2017 12:58  Mg     2.2     11-20      CAPILLARY BLOOD GLUCOSE          Medications:  MEDICATIONS  (STANDING):  amiodarone    Tablet 100 milliGRAM(s) Oral daily  aspirin enteric coated 81 milliGRAM(s) Oral daily  BACItracin/polymyxin B Ointment 1 Application(s) Topical two times a day  enalapril 2.5 milliGRAM(s) Oral daily  ferrous    sulfate 325 milliGRAM(s) Oral daily  lidocaine   Patch 1 Patch Transdermal daily  metoprolol succinate ER 25 milliGRAM(s) Oral daily  pantoprazole    Tablet 40 milliGRAM(s) Oral two times a day before meals  spironolactone 25 milliGRAM(s) Oral daily    MEDICATIONS  (PRN):  acetaminophen 325 mG/butalbital 50 mG/caffeine 40 mG 2 Tablet(s) Oral every 4 hours PRN headache  AQUAPHOR (petrolatum Ointment) 1 Application(s) Topical three times a day PRN Pruritis / Dry skin  LORazepam     Tablet 1 milliGRAM(s) Oral daily PRN Anxiety  ondansetron    Tablet 8 milliGRAM(s) Oral two times a day PRN Nausea and/or Vomiting      Weight:  Daily: 39.5kg (11/14)     Daily: 43.6kg (11/19 - bed scale weight)    Weight Change: no weight changes since admission.    Estimated energy needs:   Ht. 64". BW 43.6kg (bed scale weight - 11/19). BMI 16.6. IBW 120lbs+/- 10%. 80%IBW +/-10%.   Used IBW to calculate patient needs per patient BMI <18.5.   Increased nutrient needs 2/2 moderate protein calorie malnutrition and infection  Kcal (30-35kcal/kg IBW) = 1632-1904kcal/d  Pro (1.2-1.4g/kg IBW) = 65-76g/d  Fluids (30-35cc/kg IBW) = 1632-1904cc/d    Subjective: 71yo female admitted with left upper extremity cellulitis (11/14). Current diet order DASH/TLC (11/15). Reports poor appetite; PO intake ~50% of meals. C/o persistent nausea, slight difficulty swallowing 2/2 shortness of breath, and chest pain patient attributes to reduced PO intake; pain scale 10/10. Recalls last episode of vomiting PTA. Patient demonstrates strong knowledge of heart healthy, low sodium diet; asking for softer foods to help with swallowing. RD offered mechanical soft diet and ONS Ensure Enlives to support increased PO intake yet patient rejected. RD encouraged consistent PO intake >75% of meals to support healing, recovery, energy levels and to defer further weight loss. Patient expressed positive verbal understanding and indicated motivation for compliance.        Previous Nutrition Diagnosis:   Inadequate energy intake RT decreased PO intake 2/2 nausea and vomiting AEB <50% energy intake for >5 days    Active [ x  ]  Resolved [   ]    If resolved, new PES:     Goal: Patient will have 50% or greater PO intake per meal with +tolerance, working towards achievement of consistent PO intake >75% EER as tolerated.    Recommendations:  1. Continue to encourage PO intake >50% of meals with +tolerance, working towards >75% of meals as tolerated; honor patient food preferences   2, Continue to monitor for GI distress - patient c/o persistent nausea reducing PO intake  3. Continue to monitor pain and medically manage per patients RN prn - patient c/o persistent chest pain reducing PO intake; pain scale 10/10  4. Obtain weights weekly and track trends per flowsheet.  5. Monitor lytes and replete prn - Na 130, Cl 93 (11/20)    Education: RD encouraged consistent PO intake >75% of meals to support healing, recovery, energy levels and to defer further weight loss. Patient expressed positive verbal understanding and indicated motivation for compliance.        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  Status post percutaneous transluminal coronary angioplasty: Stented coronary artery  Atherosclerosis of coronary artery: CAD (coronary atherosclerotic disease)  Gastroesophageal reflux disease: GERD (gastroesophageal reflux disease)  Migraine: Migraine  Essential hypertension: Hypertension  Automatic implantable cardiac defibrillator in situ: AICD (automatic cardioverter/defibrillator) present  Old myocardial infarction: MI, old  Other postprocedural status: S/P appendectomy  Automatic implantable cardiac defibrillator in situ: Automatic implantable cardioverter-defibrillator in situ  Status post percutaneous transluminal coronary angioplasty: Stented coronary artery      Current Nutrition Order: DASH/TLC sodium and cholesterol restricted (11/15)       PO Intake: Good (%) [   ]  Fair (50-75%) [  ] Poor (<25%) [   ] -- reported PO intake ~40% EER    GI Issues: C/o persistent nausea. Reports last episode of vomiting PTA. GI: WDL per flowsheet (11/20).    Pain: C/o persistent chest pain; reported pain scale 10/10.    Skin Integrity: Intact; grady score 16 per flowsheet (11/20)    Labs:   11-20    130<L>  |  92<L>  |  29<H>  ----------------------------<  124<H>  4.2   |  23  |  0.93    Ca    8.8      20 Nov 2017 12:58  Mg     2.2     11-20      CAPILLARY BLOOD GLUCOSE          Medications:  MEDICATIONS  (STANDING):  amiodarone    Tablet 100 milliGRAM(s) Oral daily  aspirin enteric coated 81 milliGRAM(s) Oral daily  BACItracin/polymyxin B Ointment 1 Application(s) Topical two times a day  enalapril 2.5 milliGRAM(s) Oral daily  ferrous    sulfate 325 milliGRAM(s) Oral daily  lidocaine   Patch 1 Patch Transdermal daily  metoprolol succinate ER 25 milliGRAM(s) Oral daily  pantoprazole    Tablet 40 milliGRAM(s) Oral two times a day before meals  spironolactone 25 milliGRAM(s) Oral daily    MEDICATIONS  (PRN):  acetaminophen 325 mG/butalbital 50 mG/caffeine 40 mG 2 Tablet(s) Oral every 4 hours PRN headache  AQUAPHOR (petrolatum Ointment) 1 Application(s) Topical three times a day PRN Pruritis / Dry skin  LORazepam     Tablet 1 milliGRAM(s) Oral daily PRN Anxiety  ondansetron    Tablet 8 milliGRAM(s) Oral two times a day PRN Nausea and/or Vomiting      Weight:  Daily: 39.5kg (11/14)     Daily: 43.6kg (11/19 - bed scale weight)    Weight Change: no weight changes since admission.    Estimated energy needs:   Ht. 64". BW 43.6kg (bed scale weight - 11/19). BMI 16.6. IBW 120lbs+/- 10%. 80%IBW +/-10%.   Used IBW to calculate patient needs per patient BMI <18.5.   Increased nutrient needs 2/2 moderate protein calorie malnutrition and infection  Kcal (30-35kcal/kg IBW) = 1632-1904kcal/d  Pro (1.2-1.4g/kg IBW) = 65-76g/d  Fluids (30-35cc/kg IBW) = 1632-1904cc/d    Subjective: 71yo female admitted with left upper extremity cellulitis (11/14). Current diet order DASH/TLC (11/15). Reports poor appetite; PO intake ~50% of meals. Patient attributes reduced PO intake to persistent nausea, slight difficulty swallowing 2/2 shortness of breath, and chest pain; pain scale 10/10. Recalls last episode of vomiting PTA. Patient demonstrates strong knowledge and compliance to heart healthy, low sodium diet; asking for softer foods to help with swallowing. RD offered mechanical soft diet and ONS Ensure Enlives to support increased PO intake yet patient rejected. RD encouraged consistent PO intake >75% of meals to support healing, recovery, energy levels and to defer further weight loss. Patient expressed positive verbal understanding and indicated motivation for compliance.        Previous Nutrition Diagnosis:   Inadequate energy intake RT decreased PO intake 2/2 nausea and vomiting AEB <50% energy intake for >5 days    Active [ x  ]  Resolved [   ]    If resolved, new PES:     Goal: Patient will have 50% or greater PO intake per meal with +tolerance, working towards achievement of consistent PO intake >75% EER as tolerated.    Recommendations:  1. Continue to encourage PO intake >50% of meals with +tolerance, working towards >75% of meals as tolerated; honor patient food preferences   2, Continue to monitor for GI distress - patient c/o persistent nausea reducing PO intake  3. Continue to monitor pain and medically manage per patients RN prn - patient c/o persistent chest pain reducing PO intake; pain scale 10/10  4. Obtain weights weekly and track trends per flowsheet.  5. Monitor lytes and replete prn - Na 130, Cl 93 (11/20)    Education: RD encouraged consistent PO intake >75% of meals to support healing, recovery, energy levels and to defer further weight loss. Patient expressed positive verbal understanding and indicated motivation for compliance.        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  Status post percutaneous transluminal coronary angioplasty: Stented coronary artery  Atherosclerosis of coronary artery: CAD (coronary atherosclerotic disease)  Gastroesophageal reflux disease: GERD (gastroesophageal reflux disease)  Migraine: Migraine  Essential hypertension: Hypertension  Automatic implantable cardiac defibrillator in situ: AICD (automatic cardioverter/defibrillator) present  Old myocardial infarction: MI, old  Other postprocedural status: S/P appendectomy  Automatic implantable cardiac defibrillator in situ: Automatic implantable cardioverter-defibrillator in situ  Status post percutaneous transluminal coronary angioplasty: Stented coronary artery      Current Nutrition Order: DASH/TLC sodium and cholesterol restricted (11/15)       PO Intake: Good (%) [   ]  Fair (50-75%) [  ] Poor (<25%) [   ] -- reported PO intake ~40% EER    GI Issues: C/o persistent nausea. Reports last episode of vomiting PTA. GI: WDL per flowsheet (11/20).    Pain: C/o persistent chest pain; reported pain scale 10/10.    Skin Integrity: Intact; grady score 16 per flowsheet (11/20)    Labs:   11-20    130<L>  |  92<L>  |  29<H>  ----------------------------<  124<H>  4.2   |  23  |  0.93    Ca    8.8      20 Nov 2017 12:58  Mg     2.2     11-20      CAPILLARY BLOOD GLUCOSE          Medications:  MEDICATIONS  (STANDING):  amiodarone    Tablet 100 milliGRAM(s) Oral daily  aspirin enteric coated 81 milliGRAM(s) Oral daily  BACItracin/polymyxin B Ointment 1 Application(s) Topical two times a day  enalapril 2.5 milliGRAM(s) Oral daily  ferrous    sulfate 325 milliGRAM(s) Oral daily  lidocaine   Patch 1 Patch Transdermal daily  metoprolol succinate ER 25 milliGRAM(s) Oral daily  pantoprazole    Tablet 40 milliGRAM(s) Oral two times a day before meals  spironolactone 25 milliGRAM(s) Oral daily    MEDICATIONS  (PRN):  acetaminophen 325 mG/butalbital 50 mG/caffeine 40 mG 2 Tablet(s) Oral every 4 hours PRN headache  AQUAPHOR (petrolatum Ointment) 1 Application(s) Topical three times a day PRN Pruritis / Dry skin  LORazepam     Tablet 1 milliGRAM(s) Oral daily PRN Anxiety  ondansetron    Tablet 8 milliGRAM(s) Oral two times a day PRN Nausea and/or Vomiting      Weight:  Daily: 39.5kg (11/14)     Daily: 43.6kg (11/19 - bed scale weight)    Weight Change: 4.1 kg lwt gain since wt on 11/14    Estimated energy needs:   Ht. 64". BW 43.6kg (bed scale weight - 11/19). BMI 16.6. IBW 120lbs+/- 10%. 80%IBW +/-10%.   Used IBW to calculate patient needs per patient 80% of IBW (in the lower range of %IBW) and BMI <18.5.   Increased nutrient needs 2/2 moderate protein calorie malnutrition and infection  Kcal (30-35kcal/kg IBW) = 1632-1904kcal/d  Pro (1.2-1.4g/kg IBW) = 65-76g/d  Fluids (30-35cc/kg IBW) = 1632-1904cc/d    Subjective: 73yo female admitted with left upper extremity cellulitis (11/14). Current diet order DASH/TLC (11/15). Pt reports chest pain; pain scale 10/10.   Reports poor appetite; PO intake ~50% of meals; reports occasional nausea, RN aware; recalls last episode of vomiting PTA. Pt also states that she only wants certain items (high calorie, softer foods) since she can tolerate them better currently; rejects ONS Ensure Enlive at this time. RD obtained pt's food preferences and adjusted her menu accordingly to help increase po intake. RD also encouraged consistent PO intake >75% of meals to support healing, recovery, meet energy requirement and to defer further weight loss. Patient expressed positive verbal understanding and indicated motivation for compliance. Patient demonstrates strong knowledge and compliance to heart healthy, low sodium diet.    Previous Nutrition Diagnosis:   Inadequate energy intake RT decreased PO intake 2/2 nausea and pt's food preferences AEB <50% energy intake for >5 days    Active [ x  ]  Resolved [   ]    If resolved, new PES:     Goal: Patient PO intake >75% EER as tolerated.    Recommendations:  1. Continue to encourage PO intake >75%; honor patient food preferences   2, Continue to monitor for GI distress - patient c/o persistent nausea reducing PO intake  3. Continue to monitor pain and medically manage per patients RN prn - patient c/o persistent chest pain reducing PO intake; pain scale 10/10  4. Obtain weights weekly and track trends per flowsheet.  5. Monitor lytes and replete prn - Na 130, Cl 93 (11/20)    Education: RD encouraged consistent PO intake >75% of meals to support healing, recovery, energy levels and to defer further weight loss. Patient expressed positive verbal understanding and indicated motivation for compliance.        Risk Level: High [ x  ] Moderate [   ] Low [   ]

## 2017-11-20 NOTE — PROGRESS NOTE ADULT - PROBLEM SELECTOR PLAN 2
Pt w/ hx of PCI. Claiming to have CP however uncertain regarding timeline as patient has reported different chronology to different physicians. No elevated troponin/EKG changes to date.    - NPO after midnight for pharmacologic stress in AM  - troponins negative x2  - home ASA 81mg PO daily  - home toprol XL as above  - home atorvastatin 20mg PO qHS Pt w/ hx of PCI. Claiming to have CP however uncertain regarding timeline as patient has reported different chronology to different physicians. No elevated troponin/EKG changes to date.    -no stress test this AM due to patient intolerance to testing agent  - troponins negative x2, repeat troponins negative  - home ASA 81mg PO daily  - home toprol XL as above  - home atorvastatin 20mg PO qHS

## 2017-11-20 NOTE — PROGRESS NOTE ADULT - SUBJECTIVE AND OBJECTIVE BOX
INTERVAL HPI/OVERNIGHT EVENTS:    SUBJECTIVE: Patient seen and examined at bedside.    OBJECTIVE:    VITAL SIGNS:  ICU Vital Signs Last 24 Hrs  T(C): 36.1 (20 Nov 2017 05:00), Max: 37.2 (19 Nov 2017 13:51)  T(F): 97 (20 Nov 2017 05:00), Max: 99 (19 Nov 2017 13:51)  HR: 60 (20 Nov 2017 05:40) (60 - 74)  BP: 126/63 (20 Nov 2017 05:40) (93/63 - 126/63)  BP(mean): 88 (20 Nov 2017 05:40) (67 - 88)  ABP: --  ABP(mean): --  RR: 17 (20 Nov 2017 05:40) (16 - 17)  SpO2: 99% (20 Nov 2017 05:40) (96% - 100%)        11-18 @ 07:01  -  11-19 @ 07:00  --------------------------------------------------------  IN: 220 mL / OUT: 970 mL / NET: -750 mL    11-19 @ 07:01  -  11-20 @ 06:43  --------------------------------------------------------  IN: 450 mL / OUT: 2700 mL / NET: -2250 mL      CAPILLARY BLOOD GLUCOSE          PHYSICAL EXAM:    Constitutional: WDWN resting comfortably in bed; NAD  HEENT: NC/AT; PERRL, anicteric sclera; MMM  Neck: supple, no JVD  Cardiovascular: +S1/S2; RRR; no M/R/G  Respiratory: CTA B/L; no W/R/R; respirations appear non-labored  Gastrointestinal: abdomen soft, NT/ND; +BS x4  Extremities: WWP; no clubbing, cyanosis or edema  Vascular: 2+ radial, femoral, DP/PT pulses B/L  Dermatologic: skin normal color and turgor; no visible rashes  Neurological:     MEDICATIONS:  MEDICATIONS  (STANDING):  amiodarone    Tablet 100 milliGRAM(s) Oral daily  aspirin enteric coated 81 milliGRAM(s) Oral daily  BACItracin/polymyxin B Ointment 1 Application(s) Topical two times a day  enalapril 2.5 milliGRAM(s) Oral daily  ferrous    sulfate 325 milliGRAM(s) Oral daily  lidocaine   Patch 1 Patch Transdermal every 72 hours  metoprolol succinate ER 25 milliGRAM(s) Oral daily  pantoprazole    Tablet 40 milliGRAM(s) Oral two times a day before meals  spironolactone 25 milliGRAM(s) Oral daily    MEDICATIONS  (PRN):  acetaminophen 325 mG/butalbital 50 mG/caffeine 40 mG 2 Tablet(s) Oral every 4 hours PRN headache  AQUAPHOR (petrolatum Ointment) 1 Application(s) Topical three times a day PRN Pruritis / Dry skin  LORazepam     Tablet 1 milliGRAM(s) Oral daily PRN Anxiety  ondansetron    Tablet 8 milliGRAM(s) Oral two times a day PRN Nausea and/or Vomiting      ALLERGIES:  Allergies    codeine (Unknown)  Demerol HCl (Unknown)  nitroglycerin (Unknown)  Percocet 10/325 (Unknown)  sulfa drugs (Unknown)    Intolerances        LABS:                        9.6    6.6   )-----------( 217      ( 19 Nov 2017 06:51 )             32.8     11-19    128<L>  |  92<L>  |  20  ----------------------------<  92  4.5   |  22  |  0.93    Ca    9.2      19 Nov 2017 06:51  Mg     2.1     11-19        PT/INR - ( 19 Nov 2017 06:51 )   PT: 26.5 sec;   INR: 2.35          PTT - ( 19 Nov 2017 06:51 )  PTT:38.2 sec    CARDIAC MARKERS ( 19 Nov 2017 06:51 )  x     / <0.01 ng/mL / x     / x     / x            RADIOLOGY & ADDITIONAL TESTS: Reviewed.    ASSESSMENT:    PLAN:     CARDIOVASCULAR  #Pump -     #Vessels -     #Electrical -     #Valves -     PULMONARY    RENAL    NEURO    GI/FEN - no IVF; replete lytes prn; NPO    PPx -     LINES -     CODE - FULL.    DISPO - continue intensive level of care on [facility]. INTERVAL HPI/OVERNIGHT EVENTS: No overnight events    SUBJECTIVE: Patient seen and examined at bedside. Patient complains of pain in her wrist and ongoing shortness of breath. She is speaking in full sentences without use of nasal cannula.     OBJECTIVE:    VITAL SIGNS:  ICU Vital Signs Last 24 Hrs  T(C): 36.1 (20 Nov 2017 05:00), Max: 37.2 (19 Nov 2017 13:51)  T(F): 97 (20 Nov 2017 05:00), Max: 99 (19 Nov 2017 13:51)  HR: 60 (20 Nov 2017 05:40) (60 - 74)  BP: 126/63 (20 Nov 2017 05:40) (93/63 - 126/63)  BP(mean): 88 (20 Nov 2017 05:40) (67 - 88)  ABP: --  ABP(mean): --  RR: 17 (20 Nov 2017 05:40) (16 - 17)  SpO2: 99% (20 Nov 2017 05:40) (96% - 100%)        11-18 @ 07:01  -  11-19 @ 07:00  --------------------------------------------------------  IN: 220 mL / OUT: 970 mL / NET: -750 mL    11-19 @ 07:01  -  11-20 @ 06:43  --------------------------------------------------------  IN: 450 mL / OUT: 2700 mL / NET: -2250 mL      CAPILLARY BLOOD GLUCOSE          PHYSICAL EXAM:    Constitutional: WDWN resting comfortably in bed; NAD  HEENT: NC/AT; PERRL, anicteric sclera; MMM  Neck: supple, no JVD  Cardiovascular: +S1/S2; RRR; +systolic murmur  Respiratory: crackles in the right lower lobe; respirations appear non-labored  Gastrointestinal: abdomen soft, NT/ND; +BS x4  Extremities: WWP; no clubbing, cyanosis. No edema appreciated. Wearing a bandage wrapping on her left wrist. c/d/i.  Vascular: 2+ radial, femoral, DP/PT pulses B/L  Dermatologic: skin normal color and turgor; no visible rashes  Neurological: AAOx3    MEDICATIONS:  MEDICATIONS  (STANDING):  amiodarone    Tablet 100 milliGRAM(s) Oral daily  aspirin enteric coated 81 milliGRAM(s) Oral daily  BACItracin/polymyxin B Ointment 1 Application(s) Topical two times a day  enalapril 2.5 milliGRAM(s) Oral daily  ferrous    sulfate 325 milliGRAM(s) Oral daily  lidocaine   Patch 1 Patch Transdermal every 72 hours  metoprolol succinate ER 25 milliGRAM(s) Oral daily  pantoprazole    Tablet 40 milliGRAM(s) Oral two times a day before meals  spironolactone 25 milliGRAM(s) Oral daily    MEDICATIONS  (PRN):  acetaminophen 325 mG/butalbital 50 mG/caffeine 40 mG 2 Tablet(s) Oral every 4 hours PRN headache  AQUAPHOR (petrolatum Ointment) 1 Application(s) Topical three times a day PRN Pruritis / Dry skin  LORazepam     Tablet 1 milliGRAM(s) Oral daily PRN Anxiety  ondansetron    Tablet 8 milliGRAM(s) Oral two times a day PRN Nausea and/or Vomiting      ALLERGIES:  Allergies    codeine (Unknown)  Demerol HCl (Unknown)  nitroglycerin (Unknown)  Percocet 10/325 (Unknown)  sulfa drugs (Unknown)    Intolerances        LABS:                        9.6    6.6   )-----------( 217      ( 19 Nov 2017 06:51 )             32.8     11-19    128<L>  |  92<L>  |  20  ----------------------------<  92  4.5   |  22  |  0.93    Ca    9.2      19 Nov 2017 06:51  Mg     2.1     11-19        PT/INR - ( 19 Nov 2017 06:51 )   PT: 26.5 sec;   INR: 2.35          PTT - ( 19 Nov 2017 06:51 )  PTT:38.2 sec    CARDIAC MARKERS ( 19 Nov 2017 06:51 )  x     / <0.01 ng/mL / x     / x     / x            RADIOLOGY & ADDITIONAL TESTS: Reviewed.    ASSESSMENT: HOSPITAL COURSE:    72 F with PMHx anxiety, CAD s/p PCI, AICD/PPM, CHF, hiatal hernia, HTN, GERD, insomnia,  ventricular tachycardia, spinal stenosis s/p herniated disc repair, and arthritis was BIBA complaining of 1-day history of left upper extremity swelling, redness associated with pain. Patient is known to Dr. Faust and Dr. Alvarenga. Of note, patient has a ?hx of VLADIMIR and was prescribed Ethambutol and Azithromycin by , however has been unable to tolerate medications due to nausea and vomiting.    As per ED physician, the swelling in LUE was very subjective. Vitals in ED were overall unremarkable. Patient underwent a LUE US which was negative. Ordered for Clindamycin, which patient refused. On floors pt was given bacitracin ointment, reported improvement in swelling and redness over a day. During the course of her admission, her labs were significant for iron deficiency anemia and was started on oral ferrous sulfate tabs. pt is clinically asymptomatic, hd stable and is being discharged on her home medications.     Prior to discharge patient began to experience atypical chest pain. EKG was negative for ischemia, troponins were also negative. Patient was admitted to the cardiac telemetry unit for monitoring. Repeat echo did not show change from previous. Repeat lower extremity doppler was negative for dvts. Patient was unable to undergo stress testing due to an allergy but given no changes on cardiac echo or EKG with repeatedly negative troponins and hemodynamic stability, patient was deemed safe for discharge with outpatient follow up with her physician, Dr. Alvarenga. Patient was saturating well on room air, intermittently wearing 1L NC of oxygen by preference. Patient refused walking or evaluation with physical therapy to evaluate for decompensation with exercise and was consequently unable receive a prescription for home oxygen. Per social work, patient can be recommended for K[facility]. Patient is recommended for the following consults before leaving: psychiatry to evaluate her for anxiety and possibly adjust her medications, pain management to evaluate her needs regarding her intermittent arm/wrist pain and ethics if problems arise with discharge planning.       INTERVAL HPI/OVERNIGHT EVENTS: No overnight events    SUBJECTIVE: Patient seen and examined at bedside. Patient complains of pain in her wrist and ongoing shortness of breath. She is speaking in full sentences without use of nasal cannula.     OBJECTIVE:    VITAL SIGNS:  ICU Vital Signs Last 24 Hrs  T(C): 36.1 (20 Nov 2017 05:00), Max: 37.2 (19 Nov 2017 13:51)  T(F): 97 (20 Nov 2017 05:00), Max: 99 (19 Nov 2017 13:51)  HR: 60 (20 Nov 2017 05:40) (60 - 74)  BP: 126/63 (20 Nov 2017 05:40) (93/63 - 126/63)  BP(mean): 88 (20 Nov 2017 05:40) (67 - 88)  ABP: --  ABP(mean): --  RR: 17 (20 Nov 2017 05:40) (16 - 17)  SpO2: 99% (20 Nov 2017 05:40) (96% - 100%)        11-18 @ 07:01  -  11-19 @ 07:00  --------------------------------------------------------  IN: 220 mL / OUT: 970 mL / NET: -750 mL    11-19 @ 07:01  -  11-20 @ 06:43  --------------------------------------------------------  IN: 450 mL / OUT: 2700 mL / NET: -2250 mL      CAPILLARY BLOOD GLUCOSE          PHYSICAL EXAM:    Constitutional: WDWN resting comfortably in bed; NAD  HEENT: NC/AT; PERRL, anicteric sclera; MMM  Neck: supple, no JVD  Cardiovascular: +S1/S2; RRR; +systolic murmur  Respiratory: crackles in the right lower lobe; respirations appear non-labored  Gastrointestinal: abdomen soft, NT/ND; +BS x4  Extremities: WWP; no clubbing, cyanosis. No edema appreciated. Wearing a bandage wrapping on her left wrist. c/d/i.  Vascular: 2+ radial, femoral, DP/PT pulses B/L  Dermatologic: skin normal color and turgor; no visible rashes  Neurological: AAOx3    MEDICATIONS:  MEDICATIONS  (STANDING):  amiodarone    Tablet 100 milliGRAM(s) Oral daily  aspirin enteric coated 81 milliGRAM(s) Oral daily  BACItracin/polymyxin B Ointment 1 Application(s) Topical two times a day  enalapril 2.5 milliGRAM(s) Oral daily  ferrous    sulfate 325 milliGRAM(s) Oral daily  lidocaine   Patch 1 Patch Transdermal every 72 hours  metoprolol succinate ER 25 milliGRAM(s) Oral daily  pantoprazole    Tablet 40 milliGRAM(s) Oral two times a day before meals  spironolactone 25 milliGRAM(s) Oral daily    MEDICATIONS  (PRN):  acetaminophen 325 mG/butalbital 50 mG/caffeine 40 mG 2 Tablet(s) Oral every 4 hours PRN headache  AQUAPHOR (petrolatum Ointment) 1 Application(s) Topical three times a day PRN Pruritis / Dry skin  LORazepam     Tablet 1 milliGRAM(s) Oral daily PRN Anxiety  ondansetron    Tablet 8 milliGRAM(s) Oral two times a day PRN Nausea and/or Vomiting      ALLERGIES:  Allergies    codeine (Unknown)  Demerol HCl (Unknown)  nitroglycerin (Unknown)  Percocet 10/325 (Unknown)  sulfa drugs (Unknown)    Intolerances        LABS:                        9.6    6.6   )-----------( 217      ( 19 Nov 2017 06:51 )             32.8     11-19    128<L>  |  92<L>  |  20  ----------------------------<  92  4.5   |  22  |  0.93    Ca    9.2      19 Nov 2017 06:51  Mg     2.1     11-19        PT/INR - ( 19 Nov 2017 06:51 )   PT: 26.5 sec;   INR: 2.35          PTT - ( 19 Nov 2017 06:51 )  PTT:38.2 sec    CARDIAC MARKERS ( 19 Nov 2017 06:51 )  x     / <0.01 ng/mL / x     / x     / x            RADIOLOGY & ADDITIONAL TESTS: Reviewed.    ASSESSMENT:

## 2017-11-20 NOTE — PROGRESS NOTE ADULT - PROBLEM SELECTOR PLAN 3
s/p AICD/PPM  - home amiodarone 100mg PO daily  - EP interrogated AICD: "Normal dual chamber ICD functioning. AFib burden < 1%.  Few episodes of atrial arrhythmia lasted for 7 to 20 seconds.  No AFIB episodes noted since the last diagnostic reset on August 2017.  Episodes of "non-sustained ventricular events" are really just the abovementioned atrial tach with 1:1 conduction. No VT or ICD shock recently (ie from the last reset in august). " Patient able to move arms, has strong pulses, intact sensation, however complains of ongoing pain that she says stop her from moving her hands.   -lidocain patch q24  -consult pain management    #headache - possibly tension oriented, patient claims history of migrains  -fioricet 650mg x2 q4h, will continue at this dose for tonight and assess in the AM  -acetaminophen levels ordered, follow up 11/21 AM

## 2017-11-21 LAB
ALBUMIN SERPL ELPH-MCNC: 4.2 G/DL — SIGNIFICANT CHANGE UP (ref 3.3–5)
ALP SERPL-CCNC: 85 U/L — SIGNIFICANT CHANGE UP (ref 40–120)
ALT FLD-CCNC: 13 U/L — SIGNIFICANT CHANGE UP (ref 10–45)
ANION GAP SERPL CALC-SCNC: 14 MMOL/L — SIGNIFICANT CHANGE UP (ref 5–17)
APAP SERPL-MCNC: <15 UG/ML — SIGNIFICANT CHANGE UP (ref 10–30)
APTT BLD: 36.5 SEC — SIGNIFICANT CHANGE UP (ref 27.5–37.4)
AST SERPL-CCNC: 12 U/L — SIGNIFICANT CHANGE UP (ref 10–40)
BILIRUB SERPL-MCNC: 0.3 MG/DL — SIGNIFICANT CHANGE UP (ref 0.2–1.2)
BUN SERPL-MCNC: 24 MG/DL — HIGH (ref 7–23)
CALCIUM SERPL-MCNC: 9.4 MG/DL — SIGNIFICANT CHANGE UP (ref 8.4–10.5)
CHLORIDE SERPL-SCNC: 95 MMOL/L — LOW (ref 96–108)
CO2 SERPL-SCNC: 24 MMOL/L — SIGNIFICANT CHANGE UP (ref 22–31)
CREAT SERPL-MCNC: 0.9 MG/DL — SIGNIFICANT CHANGE UP (ref 0.5–1.3)
GLUCOSE SERPL-MCNC: 85 MG/DL — SIGNIFICANT CHANGE UP (ref 70–99)
HCT VFR BLD CALC: 34.1 % — LOW (ref 34.5–45)
HGB BLD-MCNC: 9.8 G/DL — LOW (ref 11.5–15.5)
INR BLD: 1.98 — HIGH (ref 0.88–1.16)
MAGNESIUM SERPL-MCNC: 2.3 MG/DL — SIGNIFICANT CHANGE UP (ref 1.6–2.6)
MCHC RBC-ENTMCNC: 20 PG — LOW (ref 27–34)
MCHC RBC-ENTMCNC: 28.7 G/DL — LOW (ref 32–36)
MCV RBC AUTO: 69.7 FL — LOW (ref 80–100)
PLATELET # BLD AUTO: 214 K/UL — SIGNIFICANT CHANGE UP (ref 150–400)
POTASSIUM SERPL-MCNC: 4.3 MMOL/L — SIGNIFICANT CHANGE UP (ref 3.5–5.3)
POTASSIUM SERPL-SCNC: 4.3 MMOL/L — SIGNIFICANT CHANGE UP (ref 3.5–5.3)
PROT SERPL-MCNC: 7.4 G/DL — SIGNIFICANT CHANGE UP (ref 6–8.3)
PROTHROM AB SERPL-ACNC: 22.3 SEC — HIGH (ref 9.8–12.7)
RBC # BLD: 4.89 M/UL — SIGNIFICANT CHANGE UP (ref 3.8–5.2)
RBC # FLD: 19.9 % — HIGH (ref 10.3–16.9)
SODIUM SERPL-SCNC: 133 MMOL/L — LOW (ref 135–145)
WBC # BLD: 6.9 K/UL — SIGNIFICANT CHANGE UP (ref 3.8–10.5)
WBC # FLD AUTO: 6.9 K/UL — SIGNIFICANT CHANGE UP (ref 3.8–10.5)

## 2017-11-21 PROCEDURE — 99233 SBSQ HOSP IP/OBS HIGH 50: CPT

## 2017-11-21 RX ORDER — WARFARIN SODIUM 2.5 MG/1
7 TABLET ORAL ONCE
Qty: 0 | Refills: 0 | Status: COMPLETED | OUTPATIENT
Start: 2017-11-21 | End: 2017-11-21

## 2017-11-21 RX ORDER — WARFARIN SODIUM 2.5 MG/1
7 TABLET ORAL ONCE
Qty: 0 | Refills: 0 | Status: DISCONTINUED | OUTPATIENT
Start: 2017-11-21 | End: 2017-11-21

## 2017-11-21 RX ADMIN — LIDOCAINE 1 PATCH: 4 CREAM TOPICAL at 12:24

## 2017-11-21 RX ADMIN — Medication 2 TABLET(S): at 05:42

## 2017-11-21 RX ADMIN — Medication 2 TABLET(S): at 22:34

## 2017-11-21 RX ADMIN — BACITRACIN AND POLYMYXIN B SULFATE 1 APPLICATION(S): 500; 10000 OINTMENT TOPICAL at 06:13

## 2017-11-21 RX ADMIN — BACITRACIN AND POLYMYXIN B SULFATE 1 APPLICATION(S): 500; 10000 OINTMENT TOPICAL at 22:34

## 2017-11-21 RX ADMIN — Medication 1 TABLET(S): at 16:21

## 2017-11-21 RX ADMIN — LIDOCAINE 1 PATCH: 4 CREAM TOPICAL at 00:01

## 2017-11-21 RX ADMIN — Medication 25 MILLIGRAM(S): at 07:06

## 2017-11-21 RX ADMIN — ONDANSETRON 8 MILLIGRAM(S): 8 TABLET, FILM COATED ORAL at 16:22

## 2017-11-21 RX ADMIN — Medication 1 MILLIGRAM(S): at 17:11

## 2017-11-21 RX ADMIN — SPIRONOLACTONE 25 MILLIGRAM(S): 25 TABLET, FILM COATED ORAL at 12:24

## 2017-11-21 RX ADMIN — Medication 2 TABLET(S): at 09:59

## 2017-11-21 RX ADMIN — PANTOPRAZOLE SODIUM 40 MILLIGRAM(S): 20 TABLET, DELAYED RELEASE ORAL at 07:06

## 2017-11-21 RX ADMIN — Medication 81 MILLIGRAM(S): at 07:06

## 2017-11-21 RX ADMIN — PANTOPRAZOLE SODIUM 40 MILLIGRAM(S): 20 TABLET, DELAYED RELEASE ORAL at 16:20

## 2017-11-21 RX ADMIN — ONDANSETRON 8 MILLIGRAM(S): 8 TABLET, FILM COATED ORAL at 00:36

## 2017-11-21 RX ADMIN — Medication 1 TABLET(S): at 21:19

## 2017-11-21 RX ADMIN — Medication 1 TABLET(S): at 17:21

## 2017-11-21 RX ADMIN — LIDOCAINE 1 PATCH: 4 CREAM TOPICAL at 00:21

## 2017-11-21 RX ADMIN — Medication 2 TABLET(S): at 05:07

## 2017-11-21 RX ADMIN — Medication 2.5 MILLIGRAM(S): at 07:06

## 2017-11-21 RX ADMIN — AMIODARONE HYDROCHLORIDE 100 MILLIGRAM(S): 400 TABLET ORAL at 12:24

## 2017-11-21 RX ADMIN — WARFARIN SODIUM 7 MILLIGRAM(S): 2.5 TABLET ORAL at 16:57

## 2017-11-21 RX ADMIN — Medication 2 TABLET(S): at 10:59

## 2017-11-21 NOTE — PROGRESS NOTE ADULT - PROBLEM SELECTOR PLAN 2
Patient able to move arms, has strong pulses, intact sensation, however complains of ongoing pain that she says stop her from moving her hands.   -lidocain patch q24  -consult pain management, appreciate recommendations    #headache - possibly tension oriented, patient claims history of migrains  -fioricet 650mg x2 q4h, will continue at this dose for tonight and assess in the AM (LFTs currently stable, WNL, acetaminophen levels <15)  -pain management consulted, will follow up recommendations for alternative medication regimen

## 2017-11-21 NOTE — PROGRESS NOTE ADULT - SUBJECTIVE AND OBJECTIVE BOX
72 F with PMHx anxiety, CAD s/p PCI, AICD/PPM, CHF, hiatal hernia, HTN, GERD, insomnia,  ventricular tachycardia, spinal stenosis s/p herniated disc repair, and arthritis was BIBA complaining of 1-day history of left upper extremity swelling, redness associated with pain. Patient is known to Dr. Faust and Dr. Alvarenga. Of note, patient has a ?hx of VLADIMIR and was prescribed Ethambutol and Azithromycin by , however has been unable to tolerate medications due to nausea and vomiting.    As per ED physician, the swelling in LUE was very subjective. Vitals in ED were overall unremarkable. Patient underwent a LUE US which was negative. Ordered for Clindamycin, which patient refused. On floors pt was given bacitracin ointment, reported improvement in swelling and redness over a day. During the course of her admission, her labs were significant for iron deficiency anemia and was started on oral ferrous sulfate tabs. pt is clinically asymptomatic, hd stable and is being discharged on her home medications.     Prior to discharge patient began to experience atypical chest pain. EKG was negative for ischemia, troponins were also negative. Patient was admitted to the cardiac telemetry unit for monitoring. Repeat echo did not show change from previous. Repeat lower extremity doppler was negative for dvts. Patient was unable to undergo stress testing due to an allergy but given no changes on cardiac echo or EKG with repeatedly negative troponins and hemodynamic stability, patient was deemed safe for discharge with outpatient follow up with her physician, Dr. Alvarenga. Patient was saturating well on room air, intermittently wearing 1L NC of oxygen by preference. Patient refused walking or evaluation with physical therapy to evaluate for decompensation with exercise and was consequently unable receive a prescription for home oxygen. PT rec'd for skilled PT 3-5d/week. Psychiatry currently following. Pain management to evaluate her needs regarding her intermittent arm/wrist pain, frequent fioricet use and ethics if problems arise with discharge planning.       OVERNIGHT EVENTS: No acute overnight events.     SUBJECTIVE / INTERVAL HPI: Patient seen and examined at bedside. Patient complains of shortness of breath.     VITAL SIGNS:  Vital Signs Last 24 Hrs  T(C): 37 (21 Nov 2017 14:02), Max: 37 (21 Nov 2017 14:02)  T(F): 98.6 (21 Nov 2017 14:02), Max: 98.6 (21 Nov 2017 14:02)  HR: 70 (21 Nov 2017 12:22) (60 - 77)  BP: 103/55 (21 Nov 2017 12:22) (91/57 - 117/49)  BP(mean): 70 (21 Nov 2017 12:22) (63 - 71)  RR: 16 (21 Nov 2017 12:22) (15 - 16)  SpO2: 98% (21 Nov 2017 12:22) (94% - 100%)    PHYSICAL EXAM:    General: WDWN, frail, sitting up in bed  HEENT: NC/AT; PERRL, anicteric sclera; MMM  Neck: supple  Cardiovascular: +S1/S2; RRR, +2/6 systolic ejection murmur  Respiratory: rhonchi at bases, more prominent R>L  Gastrointestinal: soft, NT/ND; +BSx4  Extremities: WWP; no edema, clubbing or cyanosis  Vascular: 2+ radial, DP/PT pulses B/L  Neurological: AAOx3  Skin: patient wearing gauze dressing on her left wrist to mid forearm, clean/dry/intact      MEDICATIONS:  MEDICATIONS  (STANDING):  amiodarone    Tablet 100 milliGRAM(s) Oral daily  aspirin enteric coated 81 milliGRAM(s) Oral daily  BACItracin/polymyxin B Ointment 1 Application(s) Topical two times a day  enalapril 2.5 milliGRAM(s) Oral daily  ferrous    sulfate 325 milliGRAM(s) Oral daily  lidocaine   Patch 1 Patch Transdermal daily  metoprolol succinate ER 25 milliGRAM(s) Oral daily  pantoprazole    Tablet 40 milliGRAM(s) Oral two times a day before meals  spironolactone 25 milliGRAM(s) Oral daily  warfarin 7 milliGRAM(s) Oral once    MEDICATIONS  (PRN):  acetaminophen 325 mG/butalbital 50 mG/caffeine 40 mG 2 Tablet(s) Oral every 4 hours PRN headache  AQUAPHOR (petrolatum Ointment) 1 Application(s) Topical three times a day PRN Pruritis / Dry skin  LORazepam     Tablet 1 milliGRAM(s) Oral daily PRN Anxiety  ondansetron    Tablet 8 milliGRAM(s) Oral two times a day PRN Nausea and/or Vomiting      ALLERGIES:  Allergies    codeine (Unknown)  Demerol HCl (Unknown)  nitroglycerin (Unknown)  Percocet 10/325 (Unknown)  sulfa drugs (Unknown)    Intolerances        LABS:                        9.8    6.9   )-----------( 214      ( 21 Nov 2017 07:01 )             34.1     11-21    133<L>  |  95<L>  |  24<H>  ----------------------------<  85  4.3   |  24  |  0.90    Ca    9.4      21 Nov 2017 07:01  Mg     2.3     11-21    TPro  7.4  /  Alb  4.2  /  TBili  0.3  /  DBili  x   /  AST  12  /  ALT  13  /  AlkPhos  85  11-21    PT/INR - ( 21 Nov 2017 07:01 )   PT: 22.3 sec;   INR: 1.98          PTT - ( 21 Nov 2017 07:01 )  PTT:36.5 sec    CAPILLARY BLOOD GLUCOSE              RADIOLOGY & ADDITIONAL TESTS: Reviewed.  L/E Dopplers 11/20: NEGATIVE for DVTs 72 F with PMHx anxiety, CAD s/p PCI, AICD/PPM, CHF, hiatal hernia, HTN, GERD, insomnia,  ventricular tachycardia, spinal stenosis s/p herniated disc repair, and arthritis was BIBA complaining of 1-day history of left upper extremity swelling, redness associated with pain. Patient is known to Dr. Faust and Dr. Alvarenga. Of note, patient has a ?hx of VLADIMIR and was prescribed Ethambutol and Azithromycin by , however has been unable to tolerate medications due to nausea and vomiting.    As per ED physician, the swelling in LUE was very subjective. Vitals in ED were overall unremarkable. Patient underwent a LUE US which was negative. Ordered for Clindamycin, which patient refused. On floors pt was given bacitracin ointment, reported improvement in swelling and redness over a day. During the course of her admission, her labs were significant for iron deficiency anemia and was started on oral ferrous sulfate tabs. pt is clinically asymptomatic, hd stable and was being discharged on her home medications.     Prior to discharge patient began to experience atypical chest pain. EKG was negative for ischemia, troponins were also negative. Patient was admitted to the cardiac telemetry unit for monitoring. Repeat echo did not show change from previous. Repeat lower extremity doppler was negative for dvts. Patient was unable to undergo stress testing due to an allergy but given no changes on cardiac echo or EKG with repeatedly negative troponins and hemodynamic stability, patient was deemed safe for discharge with outpatient follow up with her physician, Dr. Alvarenga. Patient was saturating well on room air, intermittently wearing 1L NC of oxygen by preference. Patient refused walking or evaluation with physical therapy to evaluate for decompensation with exercise and was consequently unable receive a prescription for home oxygen. PT rec'd for skilled PT 3-5d/week. Psychiatry currently following. Pain management to evaluate her needs regarding her intermittent arm/wrist pain, frequent fioricet use and ethics if problems arise with discharge planning.       OVERNIGHT EVENTS: No acute overnight events.     SUBJECTIVE / INTERVAL HPI: Patient seen and examined at bedside. Patient complains of shortness of breath.     VITAL SIGNS:  Vital Signs Last 24 Hrs  T(C): 37 (21 Nov 2017 14:02), Max: 37 (21 Nov 2017 14:02)  T(F): 98.6 (21 Nov 2017 14:02), Max: 98.6 (21 Nov 2017 14:02)  HR: 70 (21 Nov 2017 12:22) (60 - 77)  BP: 103/55 (21 Nov 2017 12:22) (91/57 - 117/49)  BP(mean): 70 (21 Nov 2017 12:22) (63 - 71)  RR: 16 (21 Nov 2017 12:22) (15 - 16)  SpO2: 98% (21 Nov 2017 12:22) (94% - 100%)    PHYSICAL EXAM:    General: WDWN, frail, sitting up in bed  HEENT: NC/AT; PERRL, anicteric sclera; MMM  Neck: supple  Cardiovascular: +S1/S2; RRR, +2/6 systolic ejection murmur  Respiratory: rhonchi at bases, more prominent R>L  Gastrointestinal: soft, NT/ND; +BSx4  Extremities: WWP; no edema, clubbing or cyanosis  Vascular: 2+ radial, DP/PT pulses B/L  Neurological: AAOx3  Skin: patient wearing gauze dressing on her left wrist to mid forearm, clean/dry/intact      MEDICATIONS:  MEDICATIONS  (STANDING):  amiodarone    Tablet 100 milliGRAM(s) Oral daily  aspirin enteric coated 81 milliGRAM(s) Oral daily  BACItracin/polymyxin B Ointment 1 Application(s) Topical two times a day  enalapril 2.5 milliGRAM(s) Oral daily  ferrous    sulfate 325 milliGRAM(s) Oral daily  lidocaine   Patch 1 Patch Transdermal daily  metoprolol succinate ER 25 milliGRAM(s) Oral daily  pantoprazole    Tablet 40 milliGRAM(s) Oral two times a day before meals  spironolactone 25 milliGRAM(s) Oral daily  warfarin 7 milliGRAM(s) Oral once    MEDICATIONS  (PRN):  acetaminophen 325 mG/butalbital 50 mG/caffeine 40 mG 2 Tablet(s) Oral every 4 hours PRN headache  AQUAPHOR (petrolatum Ointment) 1 Application(s) Topical three times a day PRN Pruritis / Dry skin  LORazepam     Tablet 1 milliGRAM(s) Oral daily PRN Anxiety  ondansetron    Tablet 8 milliGRAM(s) Oral two times a day PRN Nausea and/or Vomiting      ALLERGIES:  Allergies    codeine (Unknown)  Demerol HCl (Unknown)  nitroglycerin (Unknown)  Percocet 10/325 (Unknown)  sulfa drugs (Unknown)    Intolerances        LABS:                        9.8    6.9   )-----------( 214      ( 21 Nov 2017 07:01 )             34.1     11-21    133<L>  |  95<L>  |  24<H>  ----------------------------<  85  4.3   |  24  |  0.90    Ca    9.4      21 Nov 2017 07:01  Mg     2.3     11-21    TPro  7.4  /  Alb  4.2  /  TBili  0.3  /  DBili  x   /  AST  12  /  ALT  13  /  AlkPhos  85  11-21    PT/INR - ( 21 Nov 2017 07:01 )   PT: 22.3 sec;   INR: 1.98          PTT - ( 21 Nov 2017 07:01 )  PTT:36.5 sec    CAPILLARY BLOOD GLUCOSE              RADIOLOGY & ADDITIONAL TESTS: Reviewed.  L/E Dopplers 11/20: NEGATIVE for DVTs

## 2017-11-21 NOTE — PROGRESS NOTE ADULT - SUBJECTIVE AND OBJECTIVE BOX
Interval Events:  Patient seen and examined at bedside.    Patient is a 72y old  Female who presents with a chief complaint of LUE swelling and redness (15 Nov 2017 11:58)  Patient resting comfortably when I walked in the room tonite.  Did PT earlier and was recommended for home PT by physical therapists.  She is talking tonite without shortness of breath.      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  Status post percutaneous transluminal coronary angioplasty: Stented coronary artery  Atherosclerosis of coronary artery: CAD (coronary atherosclerotic disease)  Gastroesophageal reflux disease: GERD (gastroesophageal reflux disease)  Migraine: Migraine  Essential hypertension: Hypertension  Automatic implantable cardiac defibrillator in situ: AICD (automatic cardioverter/defibrillator) present  Old myocardial infarction: MI, old  Other postprocedural status: S/P appendectomy  Automatic implantable cardiac defibrillator in situ: Automatic implantable cardioverter-defibrillator in situ  Status post percutaneous transluminal coronary angioplasty: Stented coronary artery      MEDICATIONS:  Pulmonary:    Antimicrobials:    Anticoagulants:  aspirin enteric coated 81 milliGRAM(s) Oral daily    Cardiac:  amiodarone    Tablet 100 milliGRAM(s) Oral daily  enalapril 2.5 milliGRAM(s) Oral daily  metoprolol succinate ER 25 milliGRAM(s) Oral daily  spironolactone 25 milliGRAM(s) Oral daily      Allergies    codeine (Unknown)  Demerol HCl (Unknown)  nitroglycerin (Unknown)  Percocet 10/325 (Unknown)  sulfa drugs (Unknown)    Intolerances        Vital Signs Last 24 Hrs  T(C): 36.7 (21 Nov 2017 17:05), Max: 37 (21 Nov 2017 14:02)  T(F): 98.1 (21 Nov 2017 17:05), Max: 98.6 (21 Nov 2017 14:02)  HR: 66 (21 Nov 2017 20:15) (60 - 77)  BP: 116/72 (21 Nov 2017 20:15) (91/57 - 116/72)  BP(mean): 84 (21 Nov 2017 20:15) (63 - 84)  RR: 16 (21 Nov 2017 20:15) (16 - 16)  SpO2: 97% (21 Nov 2017 20:15) (94% - 100%)    11-20 @ 07:01  -  11-21 @ 07:00  --------------------------------------------------------  IN: 840 mL / OUT: 1550 mL / NET: -710 mL    11-21 @ 07:01  -  11-21 @ 22:11  --------------------------------------------------------  IN: 300 mL / OUT: 1190 mL / NET: -890 mL      Lungs- clear bilaterally;  CV-  RRR S1S2;  Ext-no edema.    LABS:      CBC Full  -  ( 21 Nov 2017 07:01 )  WBC Count : 6.9 K/uL  Hemoglobin : 9.8 g/dL  Hematocrit : 34.1 %  Platelet Count - Automated : 214 K/uL  Mean Cell Volume : 69.7 fL  Mean Cell Hemoglobin : 20.0 pg  Mean Cell Hemoglobin Concentration : 28.7 g/dL  Auto Neutrophil # : x  Auto Lymphocyte # : x  Auto Monocyte # : x  Auto Eosinophil # : x  Auto Basophil # : x  Auto Neutrophil % : x  Auto Lymphocyte % : x  Auto Monocyte % : x  Auto Eosinophil % : x  Auto Basophil % : x    11-21    133<L>  |  95<L>  |  24<H>  ----------------------------<  85  4.3   |  24  |  0.90    Ca    9.4      21 Nov 2017 07:01  Mg     2.3     11-21    TPro  7.4  /  Alb  4.2  /  TBili  0.3  /  DBili  x   /  AST  12  /  ALT  13  /  AlkPhos  85  11-21    PT/INR - ( 21 Nov 2017 07:01 )   PT: 22.3 sec;   INR: 1.98          PTT - ( 21 Nov 2017 07:01 )  PTT:36.5 sec                            Assessment and Plan:  The patient is clinically stable and is ready for discharge.  The VLADIMIR medications she stopped taking as outpt for unclear reasons.  I had told the pt as an outpt that she can get a second opinion regarding her lung pathology (as an outpatient.)  I told this to her again jerzy.  I spoke with the patient's sister Tricia who is also the patient's health care proxy about the pt's overall condition.  The patient knows I spoke with her.  I have told the pt that I think she would be better living in a nursing home because of her medical needs and her expectations of care, but she is refusing.  Her sister told me  that this has been an ongoing issue with the patient for several years.  Plan is for discharge tomorrow back to her assisted living facility.

## 2017-11-21 NOTE — PROGRESS NOTE ADULT - PROBLEM SELECTOR PROBLEM 2
Arm pain, anterior, left
CAD (coronary artery disease)
CAD (coronary artery disease)
Congestive heart failure
HFrEF (heart failure with reduced ejection fraction)
Congestive heart failure
CAD (coronary artery disease)

## 2017-11-21 NOTE — PROGRESS NOTE BEHAVIORAL HEALTH - DETAILS
tired intermittent chest pain sob pt describes multiple side effects to many medications in the past

## 2017-11-21 NOTE — PROGRESS NOTE BEHAVIORAL HEALTH - NSBHADMITCOUNSEL_PSY_A_CORE
risks and benefits of treatment options/instructions for management, treatment and follow up/risk factor reduction/diagnostic results/impressions and/or recommended studies

## 2017-11-21 NOTE — PROGRESS NOTE BEHAVIORAL HEALTH - AXIS III
CAD s/p PCI, AICD/PPM, CHF, hiatal hernia, HTN, GERD, insomnia,  ventricular tachycardia, spinal stenosis s/p herniated disc repair,  arthritis

## 2017-11-21 NOTE — PROGRESS NOTE ADULT - PROBLEM SELECTOR PROBLEM 1
Anxiety disorder due to medical condition
Dermatitis
Dermatitis
HFrEF (heart failure with reduced ejection fraction)
HFrEF (heart failure with reduced ejection fraction)
Dermatitis
HFrEF (heart failure with reduced ejection fraction)
Dermatitis
HFrEF (heart failure with reduced ejection fraction)

## 2017-11-21 NOTE — PROGRESS NOTE BEHAVIORAL HEALTH - PROBLEM SELECTOR PLAN 1
Neurontin and Zoloft were discussed with pt as alternatives to Ativan. Potential anxiolytic and pain control benefits were discussed with pt. She however was minimally receptive and declined a trial of either medication. Pt refused further psychiatric follow up. As outlined before pt might be more receptive to accept medication suggestions from her primary care providers with whom she has established relationships.

## 2017-11-21 NOTE — PROGRESS NOTE BEHAVIORAL HEALTH - SUMMARY
72 F with PMHx anxiety, CAD s/p PCI, AICD/PPM, CHF, hiatal hernia, HTN, GERD, insomnia,  ventricular tachycardia, spinal stenosis s/p herniated disc repair, and arthritis. Currently pt's presentation is suggestive of JIAN, likely exacerbated by acute hospital setting and narcissistic personality disorder. Pt is minimally receptive to recommendations made by this writer. She will be more likely to accept medication recommendations from providers she has established relationships. Would consider a trial of Neurontin 300mg qday- tid if pt agrees. Currently she is refusing medication suggestions or further psychiatric care.   Pt was educated regarding risks of Ativan including falls and confusion.

## 2017-11-21 NOTE — PROGRESS NOTE ADULT - ASSESSMENT
This is a 71yo F w/ extensive PMH significant for anxiety, CAD s/p PCI, HFrEF (EF20-25% w/ AICD/PPM), h/o VT, HTN admitted for LUE erythema w/ subjective swelling since resolved; suspected etiology 2/2 dermatitis c/b development of chest pain now being transferred to cardiac telemetry, with negative workup for ACS to date.

## 2017-11-21 NOTE — PHYSICAL THERAPY INITIAL EVALUATION ADULT - ADDITIONAL COMMENTS
Patient reports that she ambulates with a rolling walker at baseline (Rollator in room). As per case management notes, patient lives in an senior care. Unable to gather further home set up info from patient due to patient continuously talking about her varying symptoms (intermittently dizzy, SOB, weak).

## 2017-11-21 NOTE — PROGRESS NOTE ADULT - PROBLEM SELECTOR PLAN 9
.  DVT PPx: coumadin    #disposition  -will consult ethics if difficulty discharging patient  -PT recommendations: skilled PT 3-5d/week    CODE: FULL    DISPO: continue monitoring on 5lachman .  DVT PPx: coumadin    #disposition  -will consult ethics if difficulty discharging patient  -PT recommendations: skilled PT 3-5d/week    CODE: FULL    DISPO: Patient received 24 hour notice at 3:57PM today

## 2017-11-21 NOTE — PHYSICAL THERAPY INITIAL EVALUATION ADULT - PERTINENT HX OF CURRENT PROBLEM, REHAB EVAL
Patient is a 72 year old female who was BIBA for L UE swelling. During the course of admission, patient has reported intermittent chest pain, however all work ups have been clear.

## 2017-11-21 NOTE — PROGRESS NOTE ADULT - PROBLEM SELECTOR PLAN 6
s/p AICD/PPM  - home amiodarone 100mg PO daily  - EP interrogated AICD: "Normal dual chamber ICD functioning. AFib burden < 1%.  Few episodes of atrial arrhythmia lasted for 7 to 20 seconds.  No AFIB episodes noted since the last diagnostic reset on August 2017.  Episodes of "non-sustained ventricular events" are really just the abovementioned atrial tach with 1:1 conduction. No VT or ICD shock recently (ie from the last reset in august). "

## 2017-11-21 NOTE — PROGRESS NOTE ADULT - PROBLEM SELECTOR PLAN 4
EF20-25% s/p AICD/PPM particularly w/ hx paroxysmal VT; not appearing in acute exacerbation  - home enalapril 2.5mg PO daily  - home toprol XL 25mg PO daily  -currently on supplemental 02 at 1L, unclear if necessary, on per patient preference, will wean patient  -echo 11/17 shows no change from prior  -lower extremity dopplers negative for DVT, swelling in legs resolved

## 2017-11-21 NOTE — PROGRESS NOTE ADULT - PROBLEM SELECTOR PROBLEM 9
Prophylactic measure
Nutrition, metabolism, and development symptoms
Nutrition, metabolism, and development symptoms
Prophylactic measure
Nutrition, metabolism, and development symptoms
Prophylactic measure

## 2017-11-21 NOTE — PROGRESS NOTE ADULT - PROBLEM SELECTOR PLAN 3
hyponatremia slightly worsening in setting of active diuresis. Patient clinically dry-appearing. Patient persistently net negative.    - improved from 128 to 130-->133. Holding lasix.    - will check serum osm, urine lytes - f/u, not yet collected.

## 2017-11-21 NOTE — PROGRESS NOTE ADULT - PROBLEM SELECTOR PLAN 1
Pt w/ severe anxiety. On Lorazepam 1mg PO at home.  - continue home lorazepam 1mg PO daily PRN  -psychiatry following, recommend risperdone 0.5mg qday for agitation/disorganization

## 2017-11-21 NOTE — PHYSICAL THERAPY INITIAL EVALUATION ADULT - GENERAL OBSERVATIONS, REHAB EVAL
Rec'd supine in bed with + heplock IV, +tele. Reports feeling short of breath and dizzy. Dr. Jacobo aware. Vitals stable. Patient appropriate to work with PT.

## 2017-11-21 NOTE — CONSULT NOTE ADULT - SUBJECTIVE AND OBJECTIVE BOX
Pain Management Consult Note - [facility] [phone]    Chief Complaint:  left wrist pain    HPI:  72 F with PMHx anxiety, CAD s/p PCI, AICD/PPM, CHF, hiatal hernia, HTN, GERD, insomnia,  ventricular tachycardia, spinal stenosis s/p herniated disc repair, and arthritis was BIBA complaining of 1-day history of left upper extremity swelling, redness associated with pain.  Pt. also complains of right wrist pain.  States she has been seeing Dr. Xiong for 21 years for spinal stenosis, herniated discs and right rotator cuff tear.  States she has been getting weekly "injections" in the right shoulder by Dr. Xiong and was given lidoderm patches from the pain in her right wrist.  States she changes the lidoderm patch q7-8hours at home and is getting some relief from it.  Pt. states she doesn't tolerate pain medications.        Pain is ___ sharp ____dull ___burning ___achy ___ Intensity: ____ mild ___mod ___severe     Location ____surgical site ____cervical _____lumbar ____abd __x__upper ext____lower ext    Worse with ____activity __x__movement _____physical therapy___ Rest    Improved with __x__medication __x__rest ____physical therapy    ROS: Const:  ___febrile   Eyes:___ENT:___CV: _+__chest pain  Resp: __+__sob  GI:_+__nausea _+__vomiting __+_abd pain ___npo ___clears +__full diet __bm  :_+ (frequency on diruetics)__ Musk: __+_pain ___spasm  Skin:___ Neuro:  __-_pqqhnurl__-_szldsbufv___ numbness ___weakness ___paresth  Psych:__anxiety  Endo:___ Heme:___Allergy:__codeine, demerol, percocet, nitro, sulfa_______, ___all others reviewed and negative    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  Status post percutaneous transluminal coronary angioplasty: Stented coronary artery  Atherosclerosis of coronary artery: CAD (coronary atherosclerotic disease)  Gastroesophageal reflux disease: GERD (gastroesophageal reflux disease)  Migraine: Migraine  Essential hypertension: Hypertension  Automatic implantable cardiac defibrillator in situ: AICD (automatic cardioverter/defibrillator) present  Old myocardial infarction: MI, old  Other postprocedural status: S/P appendectomy  Automatic implantable cardiac defibrillator in situ: Automatic implantable cardioverter-defibrillator in situ  Status post percutaneous transluminal coronary angioplasty: Stented coronary artery      SH: ___Tobacco   ___Alcohol                          FH:FAMILY HISTORY:  Family history of malignant neoplasm of trachea, bronchus, and lung: Family history of lung cancer  Family history of cardiovascular disease: Family history of congestive heart failure  Family history of ischemic heart disease (Mother, Aunt): mother, aunts, other members of mother&#x27;s side of family have hx of MI      acetaminophen 325 mG/butalbital 50 mG/caffeine 40 mG 2 Tablet(s) Oral every 4 hours PRN  amiodarone    Tablet 100 milliGRAM(s) Oral daily  AQUAPHOR (petrolatum Ointment) 1 Application(s) Topical three times a day PRN  aspirin enteric coated 81 milliGRAM(s) Oral daily  BACItracin/polymyxin B Ointment 1 Application(s) Topical two times a day  enalapril 2.5 milliGRAM(s) Oral daily  ferrous    sulfate 325 milliGRAM(s) Oral daily  lidocaine   Patch 1 Patch Transdermal daily  LORazepam     Tablet 1 milliGRAM(s) Oral daily PRN  metoprolol succinate ER 25 milliGRAM(s) Oral daily  ondansetron    Tablet 8 milliGRAM(s) Oral two times a day PRN  pantoprazole    Tablet 40 milliGRAM(s) Oral two times a day before meals  spironolactone 25 milliGRAM(s) Oral daily  warfarin 7 milliGRAM(s) Oral once      T(C): 37 (11-21-17 @ 14:02), Max: 37 (11-21-17 @ 14:02)  HR: 70 (11-21-17 @ 12:22) (60 - 77)  BP: 103/55 (11-21-17 @ 12:22) (91/57 - 117/49)  RR: 16 (11-21-17 @ 12:22) (15 - 16)  SpO2: 98% (11-21-17 @ 12:22) (94% - 100%)  Wt(kg): --    T(C): 37 (11-21-17 @ 14:02), Max: 37 (11-21-17 @ 14:02)  HR: 70 (11-21-17 @ 12:22) (60 - 77)  BP: 103/55 (11-21-17 @ 12:22) (91/57 - 117/49)  RR: 16 (11-21-17 @ 12:22) (15 - 16)  SpO2: 98% (11-21-17 @ 12:22) (94% - 100%)  Wt(kg): --    T(C): 37 (11-21-17 @ 14:02), Max: 37 (11-21-17 @ 14:02)  HR: 70 (11-21-17 @ 12:22) (60 - 77)  BP: 103/55 (11-21-17 @ 12:22) (91/57 - 117/49)  RR: 16 (11-21-17 @ 12:22) (15 - 16)  SpO2: 98% (11-21-17 @ 12:22) (94% - 100%)  Wt(kg): --    PHYSICAL EXAM:  Gen Appearance: __x_no acute distress __x_appropriate        Neuro: ___SILT feet_x___ EOM Intact Psych: AAOX_3_, _x__mood/affect appropriate        Eyes: __x_conjunctiva WNL  _x____ Pupils equal and round        ENT: _x__ears and nose atraumatic__x_ Hearing grossly intact        Neck: ___trachea midline, no visible masses ___thyroid without palpable mass    Resp: _x__Nml WOB____No tactile fremitus ___clear to auscultation    Cardio: ___extremities free from edema ____pedal pulses palpable    GI/Abdomen: ___soft _____ Nontender______Nondistended_____HSM    Lymphatic: ___no palpable nodes in neck  ___no palpable nodes calves and feet    Skin/Wound: ___Incision, __x_Dressing c/d/i (to the left arm),   ____surrounding tissues soft,  ___drain/chest tube present____    Muscular: EHL ___/5  Gastrocnemius___/5    x___absent clubbing/cyanosis      ASSESSMENT: This is a 72y old Female with a history of   CELLULITIS OF LEFT UPPEREXTREMITY  H/o or current diagnosis of HF- no contraindication to ACEI/ARBs  H/o or current diagnosis of HF- ACEI/ARB contraindication unknown  Family history of malignant neoplasm of trachea, bronchus, and lung  Family history of cardiovascular disease  Family history of ischemic heart disease (Mother, Aunt)  Mycobacterium avium-intracellulare infection  Diaphragmatic hernia  Spinal stenosis  Paroxysmal ventricular tachycardia  Congestive heart failure  Insomnia  Anxiety disorder due to medical condition  Status post percutaneous transluminal coronary angioplasty  Atherosclerosis of coronary artery  Gastroesophageal reflux disease  Migraine  Essential hypertension  Automatic implantable cardiac defibrillator in situ  Old myocardial infarction  Dermatitis  Cellulitis of left upper extremity  Arm pain, anterior, left  Hyponatremia  HFrEF (heart failure with reduced ejection fraction)  Anemia, iron deficiency  VTE (venous thromboembolism)  Chronic systolic congestive heart failure  Edema of upper extremity  Anemia  Paroxysmal ventricular tachycardia  Essential hypertension  CAD (coronary artery disease)  Congestive heart failure  Cellulitis of left upper extremity  Other postprocedural status  Automatic implantable cardiac defibrillator in situ  Status post percutaneous transluminal coronary angioplasty  ARM PAIN  and states she gets some relief from lidoderm patches.       Recommended Treatment PLAN:  1.  Suggest lidoderm patch to the right wrist daily, can consider lidoderm patch to the right wrist q12h if approved by pharmacy.

## 2017-11-21 NOTE — PROGRESS NOTE BEHAVIORAL HEALTH - NSBHFUPINTERVALHXFT_PSY_A_CORE
Pt was irritable, initially refusing to speak, then not allowing this writer to leave the room voicing multiple complaints about her care. Pt was not receptive to speaking about her anxiety, stating that she is only anxious "because she is in the hospital". Pt was difficult to interrupt. She mentioned several time her [employer] education, law degree, kieran smarter then a lot of doctors, suggestive narcissistic personality disorder. Pt was educated regarding risks of ativan, including increase in falls risk and confusion. Pt was also provided with information about alternative options which could address both pain and anxiety such as Neurontin and Zoloft. Pt was however extremely defensive and was not receptive to this information. She was focused on being cleaned and was not interested in any further discussion about psychiatric symptoms/medications.   Despite her dissatisfaction with her nursing care (of note pt's report was not consistent with observed care and patience provided by nursing staff), pt stated that she did not feel ready to go back to her residence. She voiced respect and trust towards Dr. Alvarenga however and was appreciative of her care.

## 2017-11-21 NOTE — PROGRESS NOTE ADULT - PROBLEM SELECTOR PLAN 8
F: no IVF  E: replete lytes PRN to maintain K>4, Mg>2  N: NPOpMN for possible stress test in AM, then back to DASH-TLC diet F: no IVF  E: replete lytes PRN to maintain K>4, Mg>2  N: DASH-TLC diet

## 2017-11-21 NOTE — PROGRESS NOTE ADULT - PROBLEM SELECTOR PLAN 5
Pt w/ hx of PCI. Claiming to have CP however uncertain regarding timeline as patient has reported different chronology to different physicians. No elevated troponin/EKG changes to date.    -no stress test this AM due to patient intolerance to testing agent  - troponins negative x2, repeat troponins negative  - home ASA 81mg PO daily  - home toprol XL as above  - home atorvastatin 20mg PO qHS

## 2017-11-22 VITALS — TEMPERATURE: 98 F | WEIGHT: 97.44 LBS

## 2017-11-22 RX ADMIN — Medication 2 TABLET(S): at 05:15

## 2017-11-22 RX ADMIN — SPIRONOLACTONE 25 MILLIGRAM(S): 25 TABLET, FILM COATED ORAL at 10:48

## 2017-11-22 RX ADMIN — BACITRACIN AND POLYMYXIN B SULFATE 1 APPLICATION(S): 500; 10000 OINTMENT TOPICAL at 06:03

## 2017-11-22 RX ADMIN — Medication 2 TABLET(S): at 11:47

## 2017-11-22 RX ADMIN — Medication 81 MILLIGRAM(S): at 05:57

## 2017-11-22 RX ADMIN — PANTOPRAZOLE SODIUM 40 MILLIGRAM(S): 20 TABLET, DELAYED RELEASE ORAL at 05:57

## 2017-11-22 RX ADMIN — AMIODARONE HYDROCHLORIDE 100 MILLIGRAM(S): 400 TABLET ORAL at 11:57

## 2017-11-22 RX ADMIN — Medication 2 TABLET(S): at 10:47

## 2017-11-22 RX ADMIN — Medication 25 MILLIGRAM(S): at 05:57

## 2017-11-22 RX ADMIN — Medication 2.5 MILLIGRAM(S): at 05:57

## 2017-11-22 RX ADMIN — ONDANSETRON 8 MILLIGRAM(S): 8 TABLET, FILM COATED ORAL at 05:48

## 2017-11-22 RX ADMIN — ONDANSETRON 8 MILLIGRAM(S): 8 TABLET, FILM COATED ORAL at 11:57

## 2017-11-22 RX ADMIN — Medication 2 TABLET(S): at 06:03

## 2017-11-22 RX ADMIN — LIDOCAINE 1 PATCH: 4 CREAM TOPICAL at 10:48

## 2017-11-22 NOTE — CHART NOTE - NSCHARTNOTEFT_GEN_A_CORE
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  Status post percutaneous transluminal coronary angioplasty: Stented coronary artery  Atherosclerosis of coronary artery: CAD (coronary atherosclerotic disease)  Gastroesophageal reflux disease: GERD (gastroesophageal reflux disease)  Migraine: Migraine  Essential hypertension: Hypertension  Automatic implantable cardiac defibrillator in situ: AICD (automatic cardioverter/defibrillator) present  Old myocardial infarction: MI, old  Other postprocedural status: S/P appendectomy  Automatic implantable cardiac defibrillator in situ: Automatic implantable cardioverter-defibrillator in situ  Status post percutaneous transluminal coronary angioplasty: Stented coronary artery      Current Nutrition Order: DASH/TLC     PO Intake: Good (%) [   ]  Fair (50-75%) [   ] Poor (<25%) [   ] Fair: <50% meal intake     GI Issues: pt complains of persistent nausea     Pain: pain is being managed     Skin Integrity: grady Ocampo    Labs:       133<L>  |  95<L>  |  24<H>  ----------------------------<  85  4.3   |  24  |  0.90    Ca    9.4      2017 07:01  Mg     2.3         TPro  7.4  /  Alb  4.2  /  TBili  0.3  /  DBili  x   /  AST  12  /  ALT  13  /  AlkPhos  85      CAPILLARY BLOOD GLUCOSE          Medications:  MEDICATIONS  (STANDING):  amiodarone    Tablet 100 milliGRAM(s) Oral daily  aspirin enteric coated 81 milliGRAM(s) Oral daily  BACItracin/polymyxin B Ointment 1 Application(s) Topical two times a day  enalapril 2.5 milliGRAM(s) Oral daily  ferrous    sulfate 325 milliGRAM(s) Oral daily  lidocaine   Patch 1 Patch Transdermal daily  metoprolol succinate ER 25 milliGRAM(s) Oral daily  pantoprazole    Tablet 40 milliGRAM(s) Oral two times a day before meals  spironolactone 25 milliGRAM(s) Oral daily    MEDICATIONS  (PRN):  acetaminophen 325 mG/butalbital 50 mG/caffeine 40 mG 2 Tablet(s) Oral every 4 hours PRN headache  AQUAPHOR (petrolatum Ointment) 1 Application(s) Topical three times a day PRN Pruritis / Dry skin  LORazepam     Tablet 1 milliGRAM(s) Oral daily PRN Anxiety  ondansetron    Tablet 8 milliGRAM(s) Oral two times a day PRN Nausea and/or Vomiting      Daily Weight in k.2 (2017 09:19)  () 39.5kg    Weight Change: 4.7kg weight increase noted x1 week; will continue to monitor     Estimated energy needs: IBW 54.5kg - IBW used to calculate estimated needs as pt previously 72% IBW and currently 81% IBW  Kcal (30-35kcal/kg IBW) = 1632-1904kcal/d  Pro (1.2-1.4g/kg IBW) = 65-76g/d  Fluids (30-35cc/kg IBW) = 1632-1904cc/d    Subjective: Pt seen in bed.  Pt previously with ~40% meal intake which remains consistent.  Pt complains of nausea and SOB which is effecting PO intake.  Pt is planned for discharge which is making pt feel very anxious and she does not feel ready; MD aware.  Preferences obtained and communicated to host.  RD contact information provided to pt.  Encouraged increased PO intake, as tolerated, to better meet estimated nutrient needs.  Pt denies ONS at this time.      Previous Nutrition Diagnosis: Inadequate energy intake RT decreased PO intake 2/2 nausea and SOB AEB <50% energy intake for >5 days     Active [ x  ]  Resolved [   ]    Goal: Pt to consistently meet at least 50% estimated nutrient needs    Recommendations:  1. Monitor PO intake  2. Honor food preferences  3. Appreciate continued assistance and encouragement with meals to promote PO intake    Education: Pt denies education at this time as pt with previous knowledge of cardiac diet modifications    Risk Level: High [  x ] Moderate [   ] Low [   ]

## 2017-11-22 NOTE — PROGRESS NOTE ADULT - SUBJECTIVE AND OBJECTIVE BOX
Pt. seen at 0853  Pain Management Progress Note - [facility] [phone]    HPI:  Pt. states she uses lidoderm patch BID at home for right wrist pain.  States she does not tolerate any pain medications.            Pertinent PMH: Pain at: _x__Back ___Neck___Knee ___Hip __x_Shoulder ___ Opioid tolerance    Pain is _x__ sharp ____dull ___burning ___achy ___ Intensity: ____ mild ____mod ____severe     Location _____surgical site _____cervical ___x__lumbar ____abd ____x_upper ext____lower ext    Worse with __x__activity __x__movement _____physical therapy___ Rest    Improved with ____medication _x___rest ____physical therapy    sodium chloride 0.9% Bolus  clindamycin IVPB  ondansetron Injectable  ondansetron Injectable  LORazepam     Tablet  clindamycin IVPB  amiodarone    Tablet  atorvastatin  pantoprazole    Tablet  enalapril  metoprolol succinate ER  spironolactone  aspirin enteric coated  LORazepam     Tablet  acetaminophen 300 mG/butalbital 50 mG/ caffeine 40 mG  BACItracin/polymyxin B Ointment  ondansetron Injectable  acetaminophen 325 mG/butalbital 50 mG/caffeine 40 mG  ondansetron    Tablet  ferrous    sulfate  amiodarone    Tablet  ondansetron    Tablet  LORazepam     Tablet  warfarin  LORazepam     Tablet  pantoprazole    Tablet  acetaminophen 325 mG/butalbital 50 mG/caffeine 40 mG  ondansetron    Tablet  warfarin  ondansetron Injectable  furosemide    Tablet  lidocaine   Patch  acetaminophen 325 mG/butalbital 50 mG/caffeine 40 mG        ROS: Const:  ___febrile   Eyes:___ENT:___CV: ___chest pain  Resp: ____sob  GI:___nausea ___vomiting ____abd pain ___npo ___clears __+_full diet __bm  :___ Musk: _+__pain ___spasm  Skin:___ Neuro:  ___sedation___confusion____ numbness ___weakness ___paresthesia  Psych:___anxiety  Endo:___ Heme:___Allergy:___          Hemoglobin: 9.8 g/dL (11-21 @ 07:01)        T(C): 36.4 (11-22-17 @ 09:19), Max: 37 (11-21-17 @ 14:02)  HR: 64 (11-22-17 @ 08:52) (60 - 77)  BP: 112/66 (11-22-17 @ 08:52) (103/55 - 123/60)  RR: 18 (11-22-17 @ 08:52) (12 - 18)  SpO2: 96% (11-22-17 @ 08:52) (96% - 99%)  Wt(kg): --     PHYSICAL EXAM:  Gen Appearance: _x__no acute distress _x__appropriate         Neuro: ___SILT feet__x__ EOM Intact Psych: AAOX3__, __x_mood/affect appropriate        Eyes: _x__conjunctiva WNL  ___x__ Pupils equal and round        ENT: _x__ears and nose atraumatic_x__ Hearing grossly intact        Neck: ___trachea midline, no visible masses ___thyroid without palpable mass    Resp: _x__Nml WOB____No tactile fremitus ___clear to auscultation    Cardio: ___extremities free from edema ____pedal pulses palpable    GI/Abdomen: ___soft _____ Nontender______Nondistended_____HSM    Lymphatic: ___no palpable nodes in neck  ___no palpable nodes calves and feet    Skin/Wound: ___Incision, ___Dressing c/d/i,   ____surrounding tissues soft,  ___drain/chest tube present____    Muscular: EHL ___/5  Gastrocnemius___/5    _x__absent clubbing/cyanosis         ASSESSMENT:  This is a 72y old Female with a history of:  CELLULITIS OF LEFT UPPEREXTREMITY  H/o or current diagnosis of HF- no contraindication to ACEI/ARBs  H/o or current diagnosis of HF- ACEI/ARB contraindication unknown  Family history of malignant neoplasm of trachea, bronchus, and lung  Family history of cardiovascular disease  Family history of ischemic heart disease (Mother, Aunt)  Mycobacterium avium-intracellulare infection  Mycobacterium avium-intracellulare infection  Diaphragmatic hernia  Spinal stenosis  Paroxysmal ventricular tachycardia  Congestive heart failure  Insomnia  Anxiety disorder due to medical condition  Status post percutaneous transluminal coronary angioplasty  Atherosclerosis of coronary artery  Gastroesophageal reflux disease  Migraine  Essential hypertension  Automatic implantable cardiac defibrillator in situ  Old myocardial infarction  Dermatitis  Cellulitis of left upper extremity  Arm pain, anterior, left  Hyponatremia  HFrEF (heart failure with reduced ejection fraction)  Anemia, iron deficiency  VTE (venous thromboembolism)  Migraine  Anxiety  Chronic systolic congestive heart failure  Dermatitis  Edema of upper extremity  Anemia  Prophylactic measure  Nutrition, metabolism, and development symptoms  Mycobacterium avium-intracellulare infection  Anxiety disorder due to medical condition  Paroxysmal ventricular tachycardia  Essential hypertension  CAD (coronary artery disease)  Congestive heart failure  Cellulitis of left upper extremity  Other postprocedural status  Automatic implantable cardiac defibrillator in situ  Status post percutaneous transluminal coronary angioplasty  ARM PAIN  Cellulitis of left upper extremity  90+  Migraine  Chronic systolic congestive heart failure  Anemia  Mycobacterium avium-intracellulare infection  Paroxysmal ventricular tachycardia  Essential hypertension  CAD (coronary artery disease)  Congestive heart failure (CHF)  and right wrist pain and uses lidoderm patches at home with some relief.      Recommended Treatment PLAN:  1.  Suggest lidoderm patch to right wrist BID

## 2017-11-22 NOTE — PROGRESS NOTE ADULT - PROVIDER SPECIALTY LIST ADULT
Cardiology
Critical Care
Electrophysiology
Internal Medicine
Pain Medicine
Internal Medicine
Cardiology
Internal Medicine

## 2017-11-25 DIAGNOSIS — Z79.01 LONG TERM (CURRENT) USE OF ANTICOAGULANTS: ICD-10-CM

## 2017-11-25 DIAGNOSIS — I11.0 HYPERTENSIVE HEART DISEASE WITH HEART FAILURE: ICD-10-CM

## 2017-11-25 DIAGNOSIS — E44.0 MODERATE PROTEIN-CALORIE MALNUTRITION: ICD-10-CM

## 2017-11-25 DIAGNOSIS — I25.2 OLD MYOCARDIAL INFARCTION: ICD-10-CM

## 2017-11-25 DIAGNOSIS — Z87.891 PERSONAL HISTORY OF NICOTINE DEPENDENCE: ICD-10-CM

## 2017-11-25 DIAGNOSIS — G43.909 MIGRAINE, UNSPECIFIED, NOT INTRACTABLE, WITHOUT STATUS MIGRAINOSUS: ICD-10-CM

## 2017-11-25 DIAGNOSIS — I25.10 ATHEROSCLEROTIC HEART DISEASE OF NATIVE CORONARY ARTERY WITHOUT ANGINA PECTORIS: ICD-10-CM

## 2017-11-25 DIAGNOSIS — G47.00 INSOMNIA, UNSPECIFIED: ICD-10-CM

## 2017-11-25 DIAGNOSIS — I50.22 CHRONIC SYSTOLIC (CONGESTIVE) HEART FAILURE: ICD-10-CM

## 2017-11-25 DIAGNOSIS — L30.9 DERMATITIS, UNSPECIFIED: ICD-10-CM

## 2017-11-25 DIAGNOSIS — Z82.49 FAMILY HISTORY OF ISCHEMIC HEART DISEASE AND OTHER DISEASES OF THE CIRCULATORY SYSTEM: ICD-10-CM

## 2017-11-25 DIAGNOSIS — D50.9 IRON DEFICIENCY ANEMIA, UNSPECIFIED: ICD-10-CM

## 2017-11-25 DIAGNOSIS — Z79.82 LONG TERM (CURRENT) USE OF ASPIRIN: ICD-10-CM

## 2017-11-25 DIAGNOSIS — Z95.5 PRESENCE OF CORONARY ANGIOPLASTY IMPLANT AND GRAFT: ICD-10-CM

## 2017-11-25 DIAGNOSIS — Z88.2 ALLERGY STATUS TO SULFONAMIDES: ICD-10-CM

## 2017-11-25 DIAGNOSIS — Z86.718 PERSONAL HISTORY OF OTHER VENOUS THROMBOSIS AND EMBOLISM: ICD-10-CM

## 2017-11-25 DIAGNOSIS — R07.89 OTHER CHEST PAIN: ICD-10-CM

## 2017-11-25 DIAGNOSIS — K21.9 GASTRO-ESOPHAGEAL REFLUX DISEASE WITHOUT ESOPHAGITIS: ICD-10-CM

## 2017-11-25 DIAGNOSIS — Z95.810 PRESENCE OF AUTOMATIC (IMPLANTABLE) CARDIAC DEFIBRILLATOR: ICD-10-CM

## 2017-11-25 DIAGNOSIS — Z88.6 ALLERGY STATUS TO ANALGESIC AGENT: ICD-10-CM

## 2017-11-25 DIAGNOSIS — F06.4 ANXIETY DISORDER DUE TO KNOWN PHYSIOLOGICAL CONDITION: ICD-10-CM

## 2017-12-19 PROCEDURE — 93971 EXTREMITY STUDY: CPT

## 2017-12-19 PROCEDURE — 83930 ASSAY OF BLOOD OSMOLALITY: CPT

## 2017-12-19 PROCEDURE — 85610 PROTHROMBIN TIME: CPT

## 2017-12-19 PROCEDURE — 83880 ASSAY OF NATRIURETIC PEPTIDE: CPT

## 2017-12-19 PROCEDURE — 85730 THROMBOPLASTIN TIME PARTIAL: CPT

## 2017-12-19 PROCEDURE — C8929: CPT

## 2017-12-19 PROCEDURE — 83605 ASSAY OF LACTIC ACID: CPT

## 2017-12-19 PROCEDURE — 84484 ASSAY OF TROPONIN QUANT: CPT

## 2017-12-19 PROCEDURE — 83735 ASSAY OF MAGNESIUM: CPT

## 2017-12-19 PROCEDURE — 84300 ASSAY OF URINE SODIUM: CPT

## 2017-12-19 PROCEDURE — 87086 URINE CULTURE/COLONY COUNT: CPT

## 2017-12-19 PROCEDURE — 80053 COMPREHEN METABOLIC PANEL: CPT

## 2017-12-19 PROCEDURE — 99285 EMERGENCY DEPT VISIT HI MDM: CPT | Mod: 25

## 2017-12-19 PROCEDURE — 97161 PT EVAL LOW COMPLEX 20 MIN: CPT

## 2017-12-19 PROCEDURE — 84540 ASSAY OF URINE/UREA-N: CPT

## 2017-12-19 PROCEDURE — 81003 URINALYSIS AUTO W/O SCOPE: CPT

## 2017-12-19 PROCEDURE — 80048 BASIC METABOLIC PNL TOTAL CA: CPT

## 2017-12-19 PROCEDURE — 82570 ASSAY OF URINE CREATININE: CPT

## 2017-12-19 PROCEDURE — 85027 COMPLETE CBC AUTOMATED: CPT

## 2017-12-19 PROCEDURE — 71045 X-RAY EXAM CHEST 1 VIEW: CPT

## 2017-12-19 PROCEDURE — 80307 DRUG TEST PRSMV CHEM ANLYZR: CPT

## 2017-12-19 PROCEDURE — 85025 COMPLETE CBC W/AUTO DIFF WBC: CPT

## 2017-12-19 PROCEDURE — 93005 ELECTROCARDIOGRAM TRACING: CPT

## 2017-12-19 PROCEDURE — 96374 THER/PROPH/DIAG INJ IV PUSH: CPT

## 2017-12-19 PROCEDURE — 87040 BLOOD CULTURE FOR BACTERIA: CPT

## 2017-12-19 PROCEDURE — 93970 EXTREMITY STUDY: CPT

## 2017-12-19 PROCEDURE — 97116 GAIT TRAINING THERAPY: CPT

## 2017-12-19 PROCEDURE — 82728 ASSAY OF FERRITIN: CPT

## 2017-12-19 PROCEDURE — 80076 HEPATIC FUNCTION PANEL: CPT

## 2017-12-19 PROCEDURE — 83550 IRON BINDING TEST: CPT

## 2017-12-19 PROCEDURE — 83935 ASSAY OF URINE OSMOLALITY: CPT

## 2017-12-19 PROCEDURE — 36415 COLL VENOUS BLD VENIPUNCTURE: CPT

## 2017-12-19 PROCEDURE — 84466 ASSAY OF TRANSFERRIN: CPT

## 2018-02-15 ENCOUNTER — APPOINTMENT (OUTPATIENT)
Dept: ENDOCRINOLOGY | Facility: CLINIC | Age: 73
End: 2018-02-15
Payer: MEDICARE

## 2018-02-15 ENCOUNTER — LABORATORY RESULT (OUTPATIENT)
Age: 73
End: 2018-02-15

## 2018-02-15 VITALS
SYSTOLIC BLOOD PRESSURE: 131 MMHG | WEIGHT: 95 LBS | BODY MASS INDEX: 16.31 KG/M2 | HEART RATE: 75 BPM | DIASTOLIC BLOOD PRESSURE: 76 MMHG

## 2018-02-15 DIAGNOSIS — I25.10 ATHEROSCLEROTIC HEART DISEASE OF NATIVE CORONARY ARTERY W/OUT ANGINA PECTORIS: ICD-10-CM

## 2018-02-15 DIAGNOSIS — D64.9 ANEMIA, UNSPECIFIED: ICD-10-CM

## 2018-02-15 DIAGNOSIS — I42.9 CARDIOMYOPATHY, UNSPECIFIED: ICD-10-CM

## 2018-02-15 DIAGNOSIS — E03.9 HYPOTHYROIDISM, UNSPECIFIED: ICD-10-CM

## 2018-02-15 DIAGNOSIS — R94.6 ABNORMAL RESULTS OF THYROID FUNCTION STUDIES: ICD-10-CM

## 2018-02-15 PROCEDURE — 99214 OFFICE O/P EST MOD 30 MIN: CPT

## 2018-02-20 LAB
ALBUMIN SERPL ELPH-MCNC: 4.6 G/DL
ALP BLD-CCNC: 80 U/L
ALT SERPL-CCNC: 17 U/L
ANION GAP SERPL CALC-SCNC: 19 MMOL/L
AST SERPL-CCNC: 18 U/L
BASOPHILS # BLD AUTO: 0.06 K/UL
BASOPHILS NFR BLD AUTO: 0.6 %
BILIRUB SERPL-MCNC: 0.2 MG/DL
BUN SERPL-MCNC: 23 MG/DL
CALCIUM SERPL-MCNC: 9.4 MG/DL
CHLORIDE SERPL-SCNC: 97 MMOL/L
CHOLEST SERPL-MCNC: 214 MG/DL
CHOLEST/HDLC SERPL: 2.4 RATIO
CO2 SERPL-SCNC: 18 MMOL/L
CREAT SERPL-MCNC: 0.96 MG/DL
CREAT SPEC-SCNC: 79 MG/DL
EOSINOPHIL # BLD AUTO: 0.2 K/UL
EOSINOPHIL NFR BLD AUTO: 2.2 %
GLUCOSE SERPL-MCNC: 71 MG/DL
HBA1C MFR BLD HPLC: 5.8 %
HCT VFR BLD CALC: 35.3 %
HDLC SERPL-MCNC: 90 MG/DL
HGB BLD-MCNC: 10 G/DL
IMM GRANULOCYTES NFR BLD AUTO: 0.2 %
INR PPP: 1.03 RATIO
LDLC SERPL CALC-MCNC: 110 MG/DL
LYMPHOCYTES # BLD AUTO: 2.31 K/UL
LYMPHOCYTES NFR BLD AUTO: 24.8 %
MAN DIFF?: NORMAL
MCHC RBC-ENTMCNC: 20.4 PG
MCHC RBC-ENTMCNC: 28.3 GM/DL
MCV RBC AUTO: 72.2 FL
MICROALBUMIN 24H UR DL<=1MG/L-MCNC: 0.7 MG/DL
MICROALBUMIN/CREAT 24H UR-RTO: 9 MG/G
MONOCYTES # BLD AUTO: 0.83 K/UL
MONOCYTES NFR BLD AUTO: 8.9 %
NEUTROPHILS # BLD AUTO: 5.88 K/UL
NEUTROPHILS NFR BLD AUTO: 63.3 %
PLATELET # BLD AUTO: 245 K/UL
POTASSIUM SERPL-SCNC: 4.9 MMOL/L
PROT SERPL-MCNC: 7.5 G/DL
PT BLD: 11.6 SEC
RBC # BLD: 4.89 M/UL
RBC # FLD: 20.2 %
SODIUM SERPL-SCNC: 134 MMOL/L
T3 SERPL-MCNC: 90 NG/DL
T4 FREE SERPL-MCNC: 1.4 NG/DL
THYROGLOB AB SERPL-ACNC: <20 IU/ML
THYROPEROXIDASE AB SERPL IA-ACNC: <10 IU/ML
TRIGL SERPL-MCNC: 70 MG/DL
TSH SERPL-ACNC: 6.26 UIU/ML
WBC # FLD AUTO: 9.3 K/UL

## 2018-02-22 ENCOUNTER — CHART COPY (OUTPATIENT)
Age: 73
End: 2018-02-22

## 2018-02-27 ENCOUNTER — APPOINTMENT (OUTPATIENT)
Dept: ENDOCRINOLOGY | Facility: CLINIC | Age: 73
End: 2018-02-27

## 2018-03-08 ENCOUNTER — APPOINTMENT (OUTPATIENT)
Dept: HEART AND VASCULAR | Facility: CLINIC | Age: 73
End: 2018-03-08
Payer: MEDICARE

## 2018-03-08 VITALS — DIASTOLIC BLOOD PRESSURE: 48 MMHG | HEART RATE: 81 BPM | SYSTOLIC BLOOD PRESSURE: 94 MMHG

## 2018-03-08 DIAGNOSIS — I47.2 VENTRICULAR TACHYCARDIA: ICD-10-CM

## 2018-03-08 DIAGNOSIS — I48.91 UNSPECIFIED ATRIAL FIBRILLATION: ICD-10-CM

## 2018-03-08 DIAGNOSIS — I50.22 CHRONIC SYSTOLIC (CONGESTIVE) HEART FAILURE: ICD-10-CM

## 2018-03-08 PROCEDURE — 99215 OFFICE O/P EST HI 40 MIN: CPT | Mod: 25

## 2018-03-08 PROCEDURE — 93000 ELECTROCARDIOGRAM COMPLETE: CPT

## 2018-03-08 RX ORDER — AZITHROMYCIN 200 MG/5ML
200 POWDER, FOR SUSPENSION ORAL DAILY
Qty: 150 | Refills: 11 | Status: DISCONTINUED | COMMUNITY
Start: 2017-07-18 | End: 2018-03-08

## 2018-03-08 RX ORDER — ETHAMBUTOL HYDROCHLORIDE 400 MG/1
400 TABLET ORAL
Qty: 60 | Refills: 11 | Status: DISCONTINUED | COMMUNITY
Start: 2017-09-07 | End: 2018-03-08

## 2018-03-08 RX ORDER — AZITHROMYCIN 250 MG/1
250 TABLET, FILM COATED ORAL DAILY
Qty: 30 | Refills: 11 | Status: DISCONTINUED | COMMUNITY
Start: 2017-09-07 | End: 2018-03-08

## 2018-03-14 ENCOUNTER — INPATIENT (INPATIENT)
Facility: HOSPITAL | Age: 73
LOS: 0 days | DRG: 310 | End: 2018-03-14
Attending: INTERNAL MEDICINE | Admitting: INTERNAL MEDICINE
Payer: MEDICARE

## 2018-03-14 VITALS
RESPIRATION RATE: 16 BRPM | HEART RATE: 150 BPM | DIASTOLIC BLOOD PRESSURE: 50 MMHG | OXYGEN SATURATION: 100 % | SYSTOLIC BLOOD PRESSURE: 78 MMHG

## 2018-03-14 VITALS
OXYGEN SATURATION: 100 % | WEIGHT: 119.93 LBS | HEART RATE: 180 BPM | SYSTOLIC BLOOD PRESSURE: 86 MMHG | RESPIRATION RATE: 16 BRPM | DIASTOLIC BLOOD PRESSURE: 56 MMHG

## 2018-03-14 DIAGNOSIS — I47.2 VENTRICULAR TACHYCARDIA: ICD-10-CM

## 2018-03-14 LAB
ALBUMIN SERPL ELPH-MCNC: 3.4 G/DL — SIGNIFICANT CHANGE UP (ref 3.3–5)
ALP SERPL-CCNC: 100 U/L — SIGNIFICANT CHANGE UP (ref 40–120)
ALT FLD-CCNC: 22 U/L — SIGNIFICANT CHANGE UP (ref 10–45)
ANION GAP SERPL CALC-SCNC: 16 MMOL/L — SIGNIFICANT CHANGE UP (ref 5–17)
APTT BLD: 32.8 SEC — SIGNIFICANT CHANGE UP (ref 27.5–37.4)
AST SERPL-CCNC: 16 U/L — SIGNIFICANT CHANGE UP (ref 10–40)
BASOPHILS NFR BLD AUTO: 0.3 % — SIGNIFICANT CHANGE UP (ref 0–2)
BILIRUB SERPL-MCNC: 0.2 MG/DL — SIGNIFICANT CHANGE UP (ref 0.2–1.2)
BUN SERPL-MCNC: 19 MG/DL — SIGNIFICANT CHANGE UP (ref 7–23)
CALCIUM SERPL-MCNC: 8.4 MG/DL — SIGNIFICANT CHANGE UP (ref 8.4–10.5)
CHLORIDE SERPL-SCNC: 97 MMOL/L — SIGNIFICANT CHANGE UP (ref 96–108)
CK MB CFR SERPL CALC: 2.8 NG/ML — SIGNIFICANT CHANGE UP (ref 0–6.7)
CK SERPL-CCNC: 43 U/L — SIGNIFICANT CHANGE UP (ref 25–170)
CO2 SERPL-SCNC: 16 MMOL/L — LOW (ref 22–31)
CREAT SERPL-MCNC: 1.08 MG/DL — SIGNIFICANT CHANGE UP (ref 0.5–1.3)
EOSINOPHIL NFR BLD AUTO: 0.2 % — SIGNIFICANT CHANGE UP (ref 0–6)
GLUCOSE SERPL-MCNC: 141 MG/DL — HIGH (ref 70–99)
HCT VFR BLD CALC: 34 % — LOW (ref 34.5–45)
HGB BLD-MCNC: 9.8 G/DL — LOW (ref 11.5–15.5)
INR BLD: 1.23 — HIGH (ref 0.88–1.16)
LYMPHOCYTES # BLD AUTO: 14.6 % — SIGNIFICANT CHANGE UP (ref 13–44)
MCHC RBC-ENTMCNC: 20 PG — LOW (ref 27–34)
MCHC RBC-ENTMCNC: 28.8 G/DL — LOW (ref 32–36)
MCV RBC AUTO: 69.2 FL — LOW (ref 80–100)
MONOCYTES NFR BLD AUTO: 7.9 % — SIGNIFICANT CHANGE UP (ref 2–14)
NEUTROPHILS NFR BLD AUTO: 77 % — SIGNIFICANT CHANGE UP (ref 43–77)
PLATELET # BLD AUTO: 192 K/UL — SIGNIFICANT CHANGE UP (ref 150–400)
POTASSIUM SERPL-MCNC: 3.3 MMOL/L — LOW (ref 3.5–5.3)
POTASSIUM SERPL-SCNC: 3.3 MMOL/L — LOW (ref 3.5–5.3)
PROT SERPL-MCNC: 6.4 G/DL — SIGNIFICANT CHANGE UP (ref 6–8.3)
PROTHROM AB SERPL-ACNC: 13.7 SEC — HIGH (ref 9.8–12.7)
RBC # BLD: 4.91 M/UL — SIGNIFICANT CHANGE UP (ref 3.8–5.2)
RBC # FLD: 21.6 % — HIGH (ref 10.3–16.9)
SODIUM SERPL-SCNC: 129 MMOL/L — LOW (ref 135–145)
TROPONIN T SERPL-MCNC: 0.14 NG/ML — CRITICAL HIGH (ref 0–0.01)
WBC # BLD: 10.3 K/UL — SIGNIFICANT CHANGE UP (ref 3.8–10.5)
WBC # FLD AUTO: 10.3 K/UL — SIGNIFICANT CHANGE UP (ref 3.8–10.5)

## 2018-03-14 PROCEDURE — 82550 ASSAY OF CK (CPK): CPT

## 2018-03-14 PROCEDURE — 31500 INSERT EMERGENCY AIRWAY: CPT

## 2018-03-14 PROCEDURE — 36415 COLL VENOUS BLD VENIPUNCTURE: CPT

## 2018-03-14 PROCEDURE — 96374 THER/PROPH/DIAG INJ IV PUSH: CPT | Mod: XU

## 2018-03-14 PROCEDURE — 85730 THROMBOPLASTIN TIME PARTIAL: CPT

## 2018-03-14 PROCEDURE — 82553 CREATINE MB FRACTION: CPT

## 2018-03-14 PROCEDURE — 99291 CRITICAL CARE FIRST HOUR: CPT | Mod: 25

## 2018-03-14 PROCEDURE — 84484 ASSAY OF TROPONIN QUANT: CPT

## 2018-03-14 PROCEDURE — 80053 COMPREHEN METABOLIC PANEL: CPT

## 2018-03-14 PROCEDURE — 93010 ELECTROCARDIOGRAM REPORT: CPT | Mod: 59

## 2018-03-14 PROCEDURE — 93005 ELECTROCARDIOGRAM TRACING: CPT | Mod: XU

## 2018-03-14 PROCEDURE — 85610 PROTHROMBIN TIME: CPT

## 2018-03-14 PROCEDURE — 85025 COMPLETE CBC W/AUTO DIFF WBC: CPT

## 2018-03-14 RX ORDER — AMIODARONE HYDROCHLORIDE 400 MG/1
300 TABLET ORAL ONCE
Qty: 0 | Refills: 0 | Status: COMPLETED | OUTPATIENT
Start: 2018-03-14 | End: 2018-03-14

## 2018-03-14 RX ORDER — AMIODARONE HYDROCHLORIDE 400 MG/1
1 TABLET ORAL
Qty: 900 | Refills: 0 | Status: DISCONTINUED | OUTPATIENT
Start: 2018-03-14 | End: 2018-03-14

## 2018-03-14 RX ORDER — AMIODARONE HYDROCHLORIDE 400 MG/1
150 TABLET ORAL ONCE
Qty: 0 | Refills: 0 | Status: COMPLETED | OUTPATIENT
Start: 2018-03-14 | End: 2018-03-14

## 2018-03-14 RX ADMIN — AMIODARONE HYDROCHLORIDE 600 MILLIGRAM(S): 400 TABLET ORAL at 21:30

## 2018-03-14 RX ADMIN — AMIODARONE HYDROCHLORIDE 600 MILLIGRAM(S): 400 TABLET ORAL at 21:10

## 2018-03-14 NOTE — ED PROVIDER NOTE - OBJECTIVE STATEMENT
72F with a h/o CHF with an EF of 20%, HTN, GERD, Migraine VLADIMIR, paroxsymal VT who p/w multiple defibrillator shocks (6) today, associated with CP and SOB. On arrival patient found to be in VT at a rate of 160 bpm. BP 86/50 and O2 sat 91% on arrival. She reports she took 400mg of amiodarone PO prior to arrival. Her EP is Dr. Gr, covered by Dr. goss today.

## 2018-03-14 NOTE — ED PROVIDER NOTE - MEDICAL DECISION MAKING DETAILS
pt with MMP who p/w SOB and multiple defibrillator shocks due to v-tach. Pt mentating well with borderline BP on arrival. EP Dr. Mcnamara expecting the patient and the Cards fellow was down to see the patient immediately. She was started on amiodarone and shortly after went in to PEA arrest. She was intubated and multiple rounds of CPR and code medications were given. She was in PEA for the majority of the arrest. Please refer to code sheet for complete list of medications. Her daughter was in the ER and informed of events and poor prognosis given prolonged CPR. Further efforts deemed futile, CPR stopped and the patient pronounced at 22:07. The family made arrangements with  home and the death certificate was signed. Dr. Mcnamara was notified as well.

## 2018-03-14 NOTE — ED ADULT NURSE NOTE - OBJECTIVE STATEMENT
71 y/o female with hx of anxiety, CHF, and HTN, pacemaker was BIBA to [facility] ER for SOB accompanied with back pain starting today. pt verbalized that she took her amiodarone medication 6 times and was shocked by her defibrillator. Upon assessment, pt is tachycardiac, labored breathing noted (nonrebreather 15L/min O2 applied by ems), abdomen soft, pulses palpable, no visible injuries. Pt denies chest pain, headache, blurred vision, slurred speech, nausea, vomiting, diarrhea, fever, chills, LOC, recent travel, recent injury, and palpitations. EKG performed. Care in progress.

## 2018-03-14 NOTE — ED ADULT NURSE REASSESSMENT NOTE - NS ED NURSE REASSESS COMMENT FT1
Pt was provided amiodarone 150mg bolus to help correct heart rhythm with minimal effect noted. Pt provided amiodarone 300mg IV bolus. patient became unresponsive and pulseless. ACLS in progress. Cardiology resident and MD soliman at bedside. Care in progress.

## 2018-03-14 NOTE — ED PROVIDER NOTE - PHYSICAL EXAMINATION
GEN: frail, chronically ill appearing, awake, alert, oriented to person, place, time/situation, anxious and in distress.   ENT: Airway patent, Nasal mucosa clear. Mouth with normal mucosa.  EYES: Clear bilaterally.  RESPIRATORY: satting 91% on ra, no wheezes or crackles, bibasilar rales. No hypoxia or retractions.   CARDIAC: VT rate of 160-170  ABDOMEN: Soft, nontender  MSK: Range of motion is not limited, no deformities noted.  NEURO: Alert and oriented, no focal deficits.  SKIN: Skin normal color for race, warm, dry and intact.

## 2018-03-14 NOTE — ED ADULT NURSE REASSESSMENT NOTE - NS ED NURSE REASSESS COMMENT FT1
Blood work drawn and sent to lab. Awaiting results. cardiology resident at bedside. Awaiting disposition

## 2018-03-14 NOTE — ED ADULT NURSE REASSESSMENT NOTE - NS ED NURSE REASSESS COMMENT FT1
Patient was provided acls for 40 minutes. Pt was intubated to maintain airway. PEA noted. MD soliman discussed with family patient status. ACLS ended at 1007pm. Pt .

## 2018-03-14 NOTE — ED PROVIDER NOTE - CRITICAL CARE PROVIDED
direct patient care (not related to procedure)/documentation/conducted a detailed discussion of DNR status/additional history taking/interpretation of diagnostic studies/consultation with other physicians

## 2018-03-14 NOTE — CHART NOTE - NSCHARTNOTEFT_GEN_A_CORE
72 year old female with hx of HF and 6 episodes of shock for VT today brought to ER in VT storm. Patient assessed at bedside in timely fashion when informed by ED staff. Patient observed to be in VT at 160 bpm with AV dissociation, no shocks observed. Amiodarone 300 mg IV push over 10 mins given for loading dose. Patient mentating, in respiratory distress. Observed to have agonal breathing and PEA arrest while discussing goals of care and emergent need for intubation, HCP/Sister at bedside and witnessed episode and conversation. CPR initiated immediately, unable to achieve ROSC, please see code blue note for details. Discussed prognosis and goals of care with family/HCP. Time of death 10:07 pm. EP attending informed of events and unfortunate outcome.

## 2018-03-14 NOTE — ED ADULT TRIAGE NOTE - CHIEF COMPLAINT QUOTE
BIBA from [facility] [place] for chronic SOB . worsening today,  PER EMS pt HR was 180  shortness of breath, tachycardia

## 2018-03-20 NOTE — ED PROCEDURE NOTE - CPROC ED TRACHE INTUB DETAIL1
pt intubated s/p cardiac arrest with continual CPR in progress./Difficult/crash intubation (see additional details section).

## 2018-03-27 ENCOUNTER — APPOINTMENT (OUTPATIENT)
Dept: HEART AND VASCULAR | Facility: CLINIC | Age: 73
End: 2018-03-27

## 2018-03-29 DIAGNOSIS — F41.9 ANXIETY DISORDER, UNSPECIFIED: ICD-10-CM

## 2018-03-29 DIAGNOSIS — M48.00 SPINAL STENOSIS, SITE UNSPECIFIED: ICD-10-CM

## 2018-03-29 DIAGNOSIS — Z95.810 PRESENCE OF AUTOMATIC (IMPLANTABLE) CARDIAC DEFIBRILLATOR: ICD-10-CM

## 2018-03-29 DIAGNOSIS — K21.9 GASTRO-ESOPHAGEAL REFLUX DISEASE WITHOUT ESOPHAGITIS: ICD-10-CM

## 2018-03-29 DIAGNOSIS — R06.02 SHORTNESS OF BREATH: ICD-10-CM

## 2018-03-29 DIAGNOSIS — Z88.6 ALLERGY STATUS TO ANALGESIC AGENT: ICD-10-CM

## 2018-03-29 DIAGNOSIS — I46.9 CARDIAC ARREST, CAUSE UNSPECIFIED: ICD-10-CM

## 2018-03-29 DIAGNOSIS — G43.909 MIGRAINE, UNSPECIFIED, NOT INTRACTABLE, WITHOUT STATUS MIGRAINOSUS: ICD-10-CM

## 2018-03-29 DIAGNOSIS — I50.9 HEART FAILURE, UNSPECIFIED: ICD-10-CM

## 2018-03-29 DIAGNOSIS — Z88.0 ALLERGY STATUS TO PENICILLIN: ICD-10-CM

## 2018-03-29 DIAGNOSIS — Z79.82 LONG TERM (CURRENT) USE OF ASPIRIN: ICD-10-CM

## 2018-03-29 DIAGNOSIS — I11.0 HYPERTENSIVE HEART DISEASE WITH HEART FAILURE: ICD-10-CM

## 2020-09-14 NOTE — DIETITIAN INITIAL EVALUATION ADULT. - PROBLEM/PLAN-3
Rendering Text In Billing: The slides were read, and reported in an attached document. DISPLAY PLAN FREE TEXT

## 2021-03-25 NOTE — PATIENT PROFILE ADULT. - TEACHING/LEARNING FACTORS IMPACT ABILITY TO LEARN
Group Topic:  Creative Arts    Date: 3/25/2021  Start Time: 1600  End Time: 1645  Facilitators: Sara Woodall    Focus: Expressive arts  Number in attendance: 3    Pt was invited to participate in expressive art group with several modalities to choose from. Benefits of expressive art may include expression of thoughts and emotions, enjoyment, distraction, and relaxation.    Pt was recruited for group but did not attend. Efforts to encourage participation in programming on the unit will continue.    Sara Woodall M.Ed., [facility], RYT       visual problems/anxiety/physical limitations

## 2021-07-04 NOTE — ED PROVIDER NOTE - CROS ED CONS ALL NEG
Pt arrived via EMS d/t cp that radiates to left arm with SOB, and nausea, pt was sitting in back yard when happened. 4 baby asa given per EMS    negative...

## 2021-08-29 NOTE — H&P ADULT - ATTENDING COMMENTS
oral
pt seen and examined ; reviewed vs, labs, ekg, CXR  PE findings as above w/ pertinent findings/ exceptions/ addition: dry nontender mildly erythematous skin at left forearm; there is no L arm swelling noted; R forearm covered in flector patch (brought from home); no lower ext edema/ tenderness b/l   1. edema of left upper extremity: reported edema resolved, pt w/ faint erythema at left forearm that is not warm, nontender, w/ dry skin; holding off on systemic abx; on topical polysporin; monitor for recurring/ worsening sxs  2. c/w Cardiac medications, including warfarin, follow up INR  3. monitor cbc in setting of anemia  4. follow up w/ pulm re: VLADIMIR treatment  discussed plan in detail with patient

## 2021-10-05 NOTE — PATIENT PROFILE ADULT. - DIETITIAN.
S/W Papa and gave NP message below. Confirmed NP can sign the paperwork but needs to be cosigned by an MD as well.    dietitian/nutrition services

## 2024-11-18 NOTE — DIETITIAN INITIAL EVALUATION ADULT. - ETIOLOGY
Received bedside report from E. Stellwag RN. Pt resting in stretcher with safety maintained. Respirations even and unlabored, no distress noted. Pt provided water and a sandwich. RT decreased PO intake 2/2 nausea, vomiting